# Patient Record
Sex: FEMALE | Race: WHITE | HISPANIC OR LATINO | Employment: FULL TIME | URBAN - METROPOLITAN AREA
[De-identification: names, ages, dates, MRNs, and addresses within clinical notes are randomized per-mention and may not be internally consistent; named-entity substitution may affect disease eponyms.]

---

## 2024-02-12 ENCOUNTER — INITIAL PRENATAL (OUTPATIENT)
Dept: OBSTETRICS AND GYNECOLOGY | Age: 34
End: 2024-02-12
Payer: MEDICAID

## 2024-02-12 VITALS
BODY MASS INDEX: 41.29 KG/M2 | HEIGHT: 63 IN | WEIGHT: 233 LBS | SYSTOLIC BLOOD PRESSURE: 132 MMHG | DIASTOLIC BLOOD PRESSURE: 80 MMHG

## 2024-02-12 DIAGNOSIS — Z12.4 SCREENING FOR MALIGNANT NEOPLASM OF THE CERVIX: ICD-10-CM

## 2024-02-12 DIAGNOSIS — Z87.59 HISTORY OF IUFD: ICD-10-CM

## 2024-02-12 DIAGNOSIS — Z13.89 SCREENING FOR BLOOD OR PROTEIN IN URINE: Primary | ICD-10-CM

## 2024-02-12 DIAGNOSIS — F43.10 PTSD (POST-TRAUMATIC STRESS DISORDER): ICD-10-CM

## 2024-02-12 DIAGNOSIS — F19.91 HISTORY OF DRUG USE: ICD-10-CM

## 2024-02-12 DIAGNOSIS — F41.9 ANXIETY AND DEPRESSION: ICD-10-CM

## 2024-02-12 DIAGNOSIS — F31.61 BIPOLAR DISORDER, CURRENT EPISODE MIXED, MILD: ICD-10-CM

## 2024-02-12 DIAGNOSIS — Z11.51 SPECIAL SCREENING EXAMINATION FOR HUMAN PAPILLOMAVIRUS (HPV): ICD-10-CM

## 2024-02-12 DIAGNOSIS — Z11.3 SCREENING EXAMINATION FOR VENEREAL DISEASE: ICD-10-CM

## 2024-02-12 DIAGNOSIS — Z3A.08 8 WEEKS GESTATION OF PREGNANCY: ICD-10-CM

## 2024-02-12 DIAGNOSIS — J45.41 MODERATE PERSISTENT ASTHMA WITH ACUTE EXACERBATION: ICD-10-CM

## 2024-02-12 DIAGNOSIS — Z98.891 PREVIOUS CESAREAN SECTION: ICD-10-CM

## 2024-02-12 DIAGNOSIS — F32.A ANXIETY AND DEPRESSION: ICD-10-CM

## 2024-02-12 PROBLEM — F31.9 BIPOLAR DISORDER: Status: ACTIVE | Noted: 2024-02-12

## 2024-02-12 PROBLEM — J45.909 ASTHMA: Status: ACTIVE | Noted: 2024-02-12

## 2024-02-12 LAB
GLUCOSE UR STRIP-MCNC: NEGATIVE MG/DL
PROT UR STRIP-MCNC: NEGATIVE MG/DL

## 2024-02-12 RX ORDER — ASPIRIN 81 MG/1
81 TABLET ORAL DAILY
Qty: 90 TABLET | Refills: 2 | Status: SHIPPED | OUTPATIENT
Start: 2024-02-12

## 2024-02-12 RX ORDER — FLUTICASONE PROPIONATE 110 UG/1
1 AEROSOL, METERED RESPIRATORY (INHALATION)
Qty: 12 G | Refills: 11 | Status: SHIPPED | OUTPATIENT
Start: 2024-02-12

## 2024-02-12 RX ORDER — CHOLECALCIFEROL (VITAMIN D3) 25 MCG
1 TABLET,CHEWABLE ORAL DAILY
Qty: 90 CAPSULE | Refills: 3 | Status: SHIPPED | OUTPATIENT
Start: 2024-02-12

## 2024-02-12 RX ORDER — ALBUTEROL SULFATE 90 UG/1
2 AEROSOL, METERED RESPIRATORY (INHALATION) EVERY 4 HOURS PRN
COMMUNITY

## 2024-02-14 LAB
BACTERIA UR CULT: NORMAL
BACTERIA UR CULT: NORMAL
C TRACH RRNA CVX QL NAA+PROBE: NEGATIVE
CYTOLOGIST CVX/VAG CYTO: NORMAL
CYTOLOGY CVX/VAG DOC CYTO: NORMAL
CYTOLOGY CVX/VAG DOC THIN PREP: NORMAL
DX ICD CODE: NORMAL
HIV 1 & 2 AB SER-IMP: NORMAL
HPV GENOTYPE REFLEX: NORMAL
HPV I/H RISK 4 DNA CVX QL PROBE+SIG AMP: NEGATIVE
N GONORRHOEA RRNA CVX QL NAA+PROBE: NEGATIVE
OTHER STN SPEC: NORMAL
STAT OF ADQ CVX/VAG CYTO-IMP: NORMAL

## 2024-02-17 LAB
AMPHETAMINES UR QL SCN: NEGATIVE NG/ML
BARBITURATES UR QL SCN: NEGATIVE NG/ML
BENZODIAZ UR QL: NEGATIVE NG/ML
BZE UR QL: NEGATIVE NG/ML
CARBOXYTHC UR QL CFM: POSITIVE
METHADONE UR QL SCN: NEGATIVE NG/ML
OPIATES UR QL: NEGATIVE NG/ML
PCP UR QL SCN: NEGATIVE NG/ML
PROPOXYPH UR QL SCN: NEGATIVE NG/ML

## 2024-02-19 PROBLEM — F12.90 MARIJUANA USE DURING PREGNANCY: Status: ACTIVE | Noted: 2024-02-19

## 2024-02-19 PROBLEM — O99.320 MARIJUANA USE DURING PREGNANCY: Status: ACTIVE | Noted: 2024-02-19

## 2024-02-23 LAB
ABO GROUP BLD: ABNORMAL
APTT HEX PL PPP: 3 SEC
APTT IMM NP PPP: NORMAL SEC
APTT PPP 1:1 SALINE: NORMAL SEC
APTT PPP: 24.5 SEC
AT III AG ACT/NOR PPP IA: 89 % (ref 72–124)
B2 GLYCOPROT1 IGA SER-ACNC: <10 SAU
B2 GLYCOPROT1 IGA SER-ACNC: <9 GPI IGA UNITS (ref 0–25)
B2 GLYCOPROT1 IGG SER-ACNC: <10 SGU
B2 GLYCOPROT1 IGG SER-ACNC: <9 GPI IGG UNITS (ref 0–20)
B2 GLYCOPROT1 IGM SER-ACNC: <10 SMU
B2 GLYCOPROT1 IGM SER-ACNC: <9 GPI IGM UNITS (ref 0–32)
BASOPHILS # BLD AUTO: 0.1 X10E3/UL (ref 0–0.2)
BASOPHILS NFR BLD AUTO: 1 %
BLD GP AB SCN SERPL QL: NEGATIVE
CARDIOLIPIN IGG SER IA-ACNC: <10 GPL
CARDIOLIPIN IGG SER IA-ACNC: <9 GPL U/ML (ref 0–14)
CARDIOLIPIN IGM SER IA-ACNC: <10 MPL
CARDIOLIPIN IGM SER IA-ACNC: <9 MPL U/ML (ref 0–12)
CONFIRM APTT: 0.8 SEC
CONFIRM DRVVT: NORMAL SEC
DRVVT SCREEN TO CONFIRM RATIO: NORMAL RATIO
EOSINOPHIL # BLD AUTO: 0.4 X10E3/UL (ref 0–0.4)
EOSINOPHIL NFR BLD AUTO: 4 %
ERYTHROCYTE [DISTWIDTH] IN BLOOD BY AUTOMATED COUNT: 12.5 % (ref 11.7–15.4)
F2 C.20210G>A GENO BLD/T: NORMAL
F5 P.R506Q BLD/T QL: NORMAL
FACT V ACT/NOR PPP: 124 % (ref 70–150)
HBV SURFACE AG SERPL QL IA: NEGATIVE
HCT VFR BLD AUTO: 37.4 % (ref 34–46.6)
HCV IGG SERPL QL IA: NON REACTIVE
HGB A MFR BLD ELPH: 97.7 % (ref 96.4–98.8)
HGB A2 MFR BLD ELPH: 2.3 % (ref 1.8–3.2)
HGB BLD-MCNC: 12.6 G/DL (ref 11.1–15.9)
HGB F MFR BLD ELPH: 0 % (ref 0–2)
HGB FRACT BLD-IMP: NORMAL
HGB S MFR BLD ELPH: 0 %
HIV 1+2 AB+HIV1 P24 AG SERPL QL IA: NON REACTIVE
IMM GRANULOCYTES # BLD AUTO: 0 X10E3/UL (ref 0–0.1)
IMM GRANULOCYTES NFR BLD AUTO: 0 %
IMP & REVIEW OF LAB RESULTS: NORMAL
IMP & REVIEW OF LAB RESULTS: NORMAL
INR PPP: 1 RATIO
LABORATORY COMMENT REPORT: NORMAL
LYMPHOCYTES # BLD AUTO: 2 X10E3/UL (ref 0.7–3.1)
LYMPHOCYTES NFR BLD AUTO: 19 %
MCH RBC QN AUTO: 29.9 PG (ref 26.6–33)
MCHC RBC AUTO-ENTMCNC: 33.7 G/DL (ref 31.5–35.7)
MCV RBC AUTO: 89 FL (ref 79–97)
MONOCYTES # BLD AUTO: 0.5 X10E3/UL (ref 0.1–0.9)
MONOCYTES NFR BLD AUTO: 5 %
NEUTROPHILS # BLD AUTO: 7.5 X10E3/UL (ref 1.4–7)
NEUTROPHILS NFR BLD AUTO: 71 %
PLATELET # BLD AUTO: 332 X10E3/UL (ref 150–450)
PROTHROMBIN TIME: 10.2 SEC
RBC # BLD AUTO: 4.22 X10E6/UL (ref 3.77–5.28)
RH BLD: POSITIVE
RPR SER QL: NON REACTIVE
RUBV IGG SERPL IA-ACNC: 1.25 INDEX
SCREEN DRVVT: 26.7 SEC
THROMBIN TIME: 15.6 SEC
TSH SERPL DL<=0.005 MIU/L-ACNC: 1.71 UIU/ML (ref 0.45–4.5)
WBC # BLD AUTO: 10.5 X10E3/UL (ref 3.4–10.8)

## 2024-02-28 ENCOUNTER — TELEPHONE (OUTPATIENT)
Dept: OBSTETRICS AND GYNECOLOGY | Age: 34
End: 2024-02-28
Payer: MEDICAID

## 2024-03-04 ENCOUNTER — ROUTINE PRENATAL (OUTPATIENT)
Dept: OBSTETRICS AND GYNECOLOGY | Age: 34
End: 2024-03-04
Payer: MEDICAID

## 2024-03-04 VITALS — DIASTOLIC BLOOD PRESSURE: 74 MMHG | BODY MASS INDEX: 42.34 KG/M2 | WEIGHT: 239 LBS | SYSTOLIC BLOOD PRESSURE: 128 MMHG

## 2024-03-04 DIAGNOSIS — Z3A.11 11 WEEKS GESTATION OF PREGNANCY: Primary | ICD-10-CM

## 2024-03-04 DIAGNOSIS — F41.9 ANXIETY AND DEPRESSION: ICD-10-CM

## 2024-03-04 DIAGNOSIS — F32.A ANXIETY AND DEPRESSION: ICD-10-CM

## 2024-03-04 DIAGNOSIS — Z30.46 NEXPLANON REMOVAL: ICD-10-CM

## 2024-03-04 DIAGNOSIS — Z87.59 HISTORY OF IUFD: ICD-10-CM

## 2024-03-04 DIAGNOSIS — Z98.891 PREVIOUS CESAREAN SECTION: ICD-10-CM

## 2024-03-04 LAB
GLUCOSE UR STRIP-MCNC: NEGATIVE MG/DL
PROT UR STRIP-MCNC: NEGATIVE MG/DL

## 2024-03-04 PROCEDURE — 99213 OFFICE O/P EST LOW 20 MIN: CPT | Performed by: PHYSICIAN ASSISTANT

## 2024-03-04 PROCEDURE — 11982 REMOVE DRUG IMPLANT DEVICE: CPT | Performed by: PHYSICIAN ASSISTANT

## 2024-03-04 NOTE — PROGRESS NOTES
"Chief Complaint   Patient presents with    Procedure     Ob pt, here to have nexplanon removed.       HPI: 33 y.o.  at 11w6d gestation  She is here for nexplanon removal  FHT were also documented and seen off handheld  She is doing well  Has her friend, Estrellita, with her today  Is worried about the removal of her nexplanon, was \"strapped down\" for the insertion  She can feel the nexplanon     Nexplanon Removal Procedure Note    Pre-operative Diagnosis: Nexplanon expiration    Post-operative Diagnosis: same    Indications: same    Procedure Details   The risks (including infection, bruising, irregular bleeding, pain at removal site, and injury to muscles, nerves and blood vessels) and benefits of the procedure were explained to the patient and/or guardian and written informed consent was obtained.      Nexplanon device was easily located by palpation of inner arm.  The device insertion site was painted with betadine and allowed to dry.  Device site anesthetized with 3 ml 2% lidocaine with epi.  End of device was located and 11 blade was used to make small incision.  Device was located in subcutaneous  tissue, grasped with hemostat and removed intact. Incision site was closed with steri-strips and band aid.   Pressure dressing applied.  There were no complications.      Pt tolerated procedure well      Condition:  Stable    Complications:  None    Plan:    The patient was advised to call for any rash, arm pain, fever, warmth or for prolonged bruising or bleeding. She was advised to use OTC acetaminophen as needed for mild to moderate pain.   Can remove pressure dressing in 24 hours. Band aid and steri strips in 2-3 days        .nexpla    Vitals:    24 1145   BP: 128/74   Weight: 108 kg (239 lb)       ROS:  GI:  Negative  : na  Pulmonary: Negative     A/P  1. Intrauterine pregnancy at 11w6d   2. Pregnancy Risk:  NORMAL    Diagnoses and all orders for this visit:    1. 11 weeks gestation of pregnancy " (Primary)  -     POC Urinalysis Dipstick, Multipro  -     Maximilian Panorama Prenatal Test Full Panel: Panorama Test Plus 5 Additional Microdeletions - Blood,  -     Maximilian Horizon 14 (Pan-Ethnic Standard) - Blood,    2. Anxiety and depression    3. Previous  section    4. History of IUFD    5. Nexplanon removal        -----------------------  PLAN:   Return in about 4 weeks (around 2024) for belly check/AFP?.      NACHO Botello  3/4/2024 14:00 EST

## 2024-03-11 LAB
Lab: NORMAL
NTRA 1P36 DELETION SYNDROME POPULATION-BASED RISK TEXT: NORMAL
NTRA 1P36 DELETION SYNDROME RESULT TEXT: NORMAL
NTRA 1P36 DELETION SYNDROME RISK SCORE TEXT: NORMAL
NTRA 22Q11.2 DELETION SYNDROME POPULATION-BASED RISK TEXT: NORMAL
NTRA 22Q11.2 DELETION SYNDROME RESULT TEXT: NORMAL
NTRA 22Q11.2 DELETION SYNDROME RISK SCORE TEXT: NORMAL
NTRA ANGELMAN SYNDROME POPULATION-BASED RISK TEXT: NORMAL
NTRA ANGELMAN SYNDROME RESULT TEXT: NORMAL
NTRA ANGELMAN SYNDROME RISK SCORE TEXT: NORMAL
NTRA CRI-DU-CHAT SYNDROME POPULATION-BASED RISK TEXT: NORMAL
NTRA CRI-DU-CHAT SYNDROME RESULT TEXT: NORMAL
NTRA CRI-DU-CHAT SYNDROME RISK SCORE TEXT: NORMAL
NTRA FETAL FRACTION: NORMAL
NTRA GENDER OF FETUS: NORMAL
NTRA MONOSOMY X AGE-BASED RISK TEXT: NORMAL
NTRA MONOSOMY X RESULT TEXT: NORMAL
NTRA MONOSOMY X RISK SCORE TEXT: NORMAL
NTRA PRADER-WILLI SYNDROME POPULATION-BASED RISK TEXT: NORMAL
NTRA PRADER-WILLI SYNDROME RESULT TEXT: NORMAL
NTRA PRADER-WILLI SYNDROME RISK SCORE TEXT: NORMAL
NTRA TRIPLOIDY RESULT TEXT: NORMAL
NTRA TRISOMY 13 AGE-BASED RISK TEXT: NORMAL
NTRA TRISOMY 13 RESULT TEXT: NORMAL
NTRA TRISOMY 13 RISK SCORE TEXT: NORMAL
NTRA TRISOMY 18 AGE-BASED RISK TEXT: NORMAL
NTRA TRISOMY 18 RESULT TEXT: NORMAL
NTRA TRISOMY 18 RISK SCORE TEXT: NORMAL
NTRA TRISOMY 21 AGE-BASED RISK TEXT: NORMAL
NTRA TRISOMY 21 RESULT TEXT: NORMAL
NTRA TRISOMY 21 RISK SCORE TEXT: NORMAL

## 2024-03-12 ENCOUNTER — OFFICE VISIT (OUTPATIENT)
Dept: FAMILY MEDICINE CLINIC | Facility: CLINIC | Age: 34
End: 2024-03-12
Payer: MEDICAID

## 2024-03-12 ENCOUNTER — ROUTINE PRENATAL (OUTPATIENT)
Dept: OBSTETRICS AND GYNECOLOGY | Age: 34
End: 2024-03-12
Payer: MEDICAID

## 2024-03-12 VITALS
SYSTOLIC BLOOD PRESSURE: 102 MMHG | HEART RATE: 92 BPM | DIASTOLIC BLOOD PRESSURE: 60 MMHG | TEMPERATURE: 97.1 F | OXYGEN SATURATION: 98 % | BODY MASS INDEX: 42.88 KG/M2 | HEIGHT: 63 IN | WEIGHT: 242 LBS

## 2024-03-12 VITALS — BODY MASS INDEX: 42.52 KG/M2 | WEIGHT: 240 LBS | SYSTOLIC BLOOD PRESSURE: 122 MMHG | DIASTOLIC BLOOD PRESSURE: 78 MMHG

## 2024-03-12 DIAGNOSIS — J45.20 MILD INTERMITTENT ASTHMA, UNSPECIFIED WHETHER COMPLICATED: ICD-10-CM

## 2024-03-12 DIAGNOSIS — O99.320 MARIJUANA USE DURING PREGNANCY: ICD-10-CM

## 2024-03-12 DIAGNOSIS — F12.90 MARIJUANA USE DURING PREGNANCY: ICD-10-CM

## 2024-03-12 DIAGNOSIS — Z00.00 ENCOUNTER FOR HEALTH MAINTENANCE EXAMINATION IN ADULT: Primary | ICD-10-CM

## 2024-03-12 DIAGNOSIS — M72.2 PLANTAR FASCIITIS: ICD-10-CM

## 2024-03-12 DIAGNOSIS — Z87.59 HISTORY OF IUFD: ICD-10-CM

## 2024-03-12 DIAGNOSIS — Z98.891 PREVIOUS CESAREAN SECTION: ICD-10-CM

## 2024-03-12 DIAGNOSIS — F41.9 ANXIETY AND DEPRESSION: ICD-10-CM

## 2024-03-12 DIAGNOSIS — F32.A ANXIETY AND DEPRESSION: ICD-10-CM

## 2024-03-12 DIAGNOSIS — Z13.89 SCREENING FOR BLOOD OR PROTEIN IN URINE: Primary | ICD-10-CM

## 2024-03-12 LAB
GLUCOSE UR STRIP-MCNC: NEGATIVE MG/DL
PROT UR STRIP-MCNC: NEGATIVE MG/DL

## 2024-03-12 RX ORDER — CETIRIZINE HYDROCHLORIDE 10 MG/1
1 TABLET ORAL DAILY
COMMUNITY
Start: 2024-02-21 | End: 2024-03-17

## 2024-03-12 NOTE — ASSESSMENT & PLAN NOTE
Colonoscopy: avg risk, start at age 48yo  Cervical Cancer Screening: UTD with GYN  Mammogram: avg risk, start at age 40  LDCT: former smoker - does not qualify  DEXA: indicated at age 65    Immunizations: eligible for PCV20, Tdap (will get in 3rd trimester)  Labs: reviewed today  The ASCVD Risk score (Poli CHO, et al., 2019) failed to calculate for the following reasons:    The 2019 ASCVD risk score is only valid for ages 40 to 79      Patient was counseled in regards to maintaining a healthy lifestyle, rich in whole grains, fruits and vegetables. Limit high saturated fats and processed sugars. Maintain an active lifestyle to promote overall health and well being.

## 2024-03-12 NOTE — PROGRESS NOTES
The patient is feeling much better on the steroid inhaler.  She also saw pulmonary and they recommended continuing.  No vaginal bleeding.  She had her Nexplanon removed.  Her cell free DNA and carrier testing was sent off and is pending.  She feels like her moods are good.    Hand-held ultrasound is used to visualize cardiac activity.  Doppler heart tones are positive at 145  Blood pressure 122/78 with no protein.    Assessment-13 weeks  Asthma-improved with daily steroid inhaler.  Continue rescue inhaler as needed and follow with pulmonary.  History of IUFD at 26 weeks.  Thrombophilia testing was negative.  Patient is taking low-dose aspirin.  Maternal-fetal medicine referral has been placed.  Patient did have a severe asthma exacerbation prior to the IUFD.  Bipolar, PTSD and depression-patient's moods are good today.  She is seeing therapy.  Nexplanon was removed  Prior  section-patient desires repeat  section  Genetic testing is pending.

## 2024-03-12 NOTE — ASSESSMENT & PLAN NOTE
With therapist.  Previously on med - not sure which. Did not like as it made depression worse.  Controlled. Continue current mgmt.

## 2024-03-12 NOTE — PROGRESS NOTES
"Chief Complaint  Establish Care (MURALI 9/2024/-no complaints/-requested physical )    Subjective        Lake Sprague presents to [facility]  History of Present Illness  33yoF with asthma, anxiety/depr/PTSD, bipolar disorder who presents to establish care and for annual exam.    Currently in first trimester of pregnancy. On prenatal and [id number].    Asthma - well controlled with Flovent maintenance inhaler and albuterol PRN.     Anxiety/Depr - also history of PTSD. Controlled.   Bipolar disorder - previously on medication but did not like the way she felt on this. Currently with therapist.     UTD on cancer screenings, dental/eye exams.       Objective   Vital Signs:  /60 (BP Location: Right arm, Patient Position: Sitting, Cuff Size: Adult)   Pulse 92   Temp 97.1 °F (36.2 °C) (Infrared)   Ht 160 cm (62.99\")   Wt 110 kg (242 lb)   SpO2 98%   BMI 42.88 kg/m²   Estimated body mass index is 42.88 kg/m² as calculated from the following:    Height as of this encounter: 160 cm (62.99\").    Weight as of this encounter: 110 kg (242 lb).         Physical Exam  Constitutional:       General: She is not in acute distress.  Eyes:      Conjunctiva/sclera: Conjunctivae normal.   Cardiovascular:      Rate and Rhythm: Normal rate and regular rhythm.   Pulmonary:      Effort: Pulmonary effort is normal. No respiratory distress.      Breath sounds: Normal breath sounds.   Abdominal:      Palpations: Abdomen is soft.      Tenderness: There is no abdominal tenderness.   Skin:     General: Skin is warm and dry.   Neurological:      Mental Status: She is alert and oriented to person, place, and time.   Psychiatric:         Mood and Affect: Mood normal.         Behavior: Behavior normal.        Result Review :    The following data was reviewed by: Nydia Vasquez MD on 03/12/2024:  Common labs          9/28/2023    03:37 10/1/2023    12:03 2/12/2024    10:13   Common Labs   WBC 9.62     8.37     10.5 "    Hemoglobin 13.6     13.4     12.6    Hematocrit 40.7     40.0     37.4    Platelets 329     323     332       Details          This result is from an external source.             Data reviewed : none             Assessment and Plan     Diagnoses and all orders for this visit:    1. Encounter for health maintenance examination in adult (Primary)  Assessment & Plan:  Colonoscopy: avg risk, start at age 44yo  Cervical Cancer Screening: UTD with GYN  Mammogram: avg risk, start at age 40  LDCT: former smoker - does not qualify  DEXA: indicated at age 65    Immunizations: eligible for PCV20, Tdap (will get in 3rd trimester)  Labs: reviewed today  The ASCVD Risk score (Poli CHO, et al., 2019) failed to calculate for the following reasons:    The 2019 ASCVD risk score is only valid for ages 40 to 79      Patient was counseled in regards to maintaining a healthy lifestyle, rich in whole grains, fruits and vegetables. Limit high saturated fats and processed sugars. Maintain an active lifestyle to promote overall health and well being.         2. Plantar fasciitis  Comments:  Worse with standing. Discussed good orthotics, stretches, tylenol. No anti-inflammatories in pregnancy.    3. Mild intermittent asthma, unspecified whether complicated  Assessment & Plan:  Controlled with flovent inhaler and albuterol PRN. Continue.            4. Anxiety and depression  Assessment & Plan:  With therapist.  Previously on med - not sure which. Did not like as it made depression worse.  Controlled. Continue current mgmt.                Follow Up     No follow-ups on file.  Patient was given instructions and counseling regarding her condition or for health maintenance advice. Please see specific information pulled into the AVS if appropriate.

## 2024-03-14 LAB
Lab: NEGATIVE
Lab: NORMAL
NTRA ALPHA-THALASSEMIA: NEGATIVE
NTRA BETA-HEMOGLOBINOPATHIES: NEGATIVE
NTRA CANAVAN DISEASE: NEGATIVE
NTRA CYSTIC FIBROSIS: NEGATIVE
NTRA DUCHENNE/BECKER MUSCULAR DYSTROPHY: NEGATIVE
NTRA FAMILIAL DYSAUTONOMIA: NEGATIVE
NTRA FRAGILE X SYNDROME: NEGATIVE
NTRA GALACTOSEMIA: NEGATIVE
NTRA GAUCHER DISEASE: NEGATIVE
NTRA MEDIUM CHAIN ACYL-COA DEHYDROGENASE DEFICIENCY: NEGATIVE
NTRA POLYCYSTIC KIDNEY DISEASE, AUTOSOMAL RECESSIVE: NEGATIVE
NTRA SMITH-LEMLI-OPITZ SYNDROME: NEGATIVE
NTRA SPINAL MUSCULAR ATROPHY: NEGATIVE
NTRA TAY-SACHS DISEASE: NEGATIVE

## 2024-03-18 ENCOUNTER — TELEMEDICINE (OUTPATIENT)
Dept: PSYCHIATRY | Facility: CLINIC | Age: 34
End: 2024-03-18
Payer: MEDICAID

## 2024-03-18 DIAGNOSIS — F33.1 MAJOR DEPRESSIVE DISORDER, RECURRENT EPISODE, MODERATE: ICD-10-CM

## 2024-03-18 DIAGNOSIS — F43.10 POST TRAUMATIC STRESS DISORDER (PTSD): ICD-10-CM

## 2024-03-18 DIAGNOSIS — F41.1 GENERALIZED ANXIETY DISORDER: ICD-10-CM

## 2024-03-18 DIAGNOSIS — F31.81 BIPOLAR II DISORDER: ICD-10-CM
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
## 2024-03-20 ENCOUNTER — TELEPHONE (OUTPATIENT)
Dept: PSYCHIATRY | Facility: CLINIC | Age: 34
End: 2024-03-20
Payer: MEDICAID

## 2024-03-20 NOTE — TELEPHONE ENCOUNTER
Sent Patient a [email or web address] message welcoming patient to our practice and asking for a feedback.

## 2024-03-22 ENCOUNTER — TRANSCRIBE ORDERS (OUTPATIENT)
Dept: ULTRASOUND IMAGING | Facility: HOSPITAL | Age: 34
End: 2024-03-22
Payer: MEDICAID

## 2024-03-22 DIAGNOSIS — Z87.59 HISTORY OF IUFD: Primary | ICD-10-CM

## 2024-03-26 ENCOUNTER — OFFICE VISIT (OUTPATIENT)
Dept: OBSTETRICS AND GYNECOLOGY | Facility: CLINIC | Age: 34
End: 2024-03-26
Payer: MEDICAID

## 2024-03-26 ENCOUNTER — HOSPITAL ENCOUNTER (OUTPATIENT)
Dept: ULTRASOUND IMAGING | Facility: HOSPITAL | Age: 34
Discharge: HOME | End: 2024-03-26
Admitting: OBSTETRICS & GYNECOLOGY
Payer: MEDICAID

## 2024-03-26 VITALS
DIASTOLIC BLOOD PRESSURE: 70 MMHG | HEIGHT: 63 IN | SYSTOLIC BLOOD PRESSURE: 113 MMHG | WEIGHT: 253 LBS | TEMPERATURE: 98 F | HEART RATE: 88 BPM | BODY MASS INDEX: 44.83 KG/M2

## 2024-03-26 DIAGNOSIS — Z87.59 HISTORY OF IUFD: ICD-10-CM

## 2024-03-26 DIAGNOSIS — O99.519 ASTHMA AFFECTING PREGNANCY, ANTEPARTUM: Primary | ICD-10-CM

## 2024-03-26 DIAGNOSIS — J45.909 ASTHMA AFFECTING PREGNANCY, ANTEPARTUM: Primary | ICD-10-CM

## 2024-03-26 PROCEDURE — 76805 OB US >/= 14 WKS SNGL FETUS: CPT

## 2024-03-26 NOTE — PROGRESS NOTES
Pt reports that she is doing well and denies vaginal bleeding, cramping, contractions or LOF at this time. Notes occasional lower abdominal cramping/stretching discomfort, denies consistency. Reports starting to feel fetal movements/flutters at this point in pregnancy. Reviewed when to call OB office or present to L&D for evaluation with symptoms such as vaginal bleeding, LOF or ctxs. Pt verbalized understanding. Denies HA, visual changes or epigastric pain at time of appointment. Denies any additional complaints at time of appointment. Next OB appointment scheduled for 4/10.    Vitals:    03/26/24 1420   BP: 113/70   Pulse: 88   Temp: 98 °F (36.7 °C)
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
## 2024-03-27 PROBLEM — F31.81 BIPOLAR 2 DISORDER: Status: ACTIVE | Noted: 2024-02-12

## 2024-03-29 ENCOUNTER — TELEMEDICINE (OUTPATIENT)
Dept: PSYCHIATRY | Facility: CLINIC | Age: 34
End: 2024-03-29
Payer: MEDICAID

## 2024-03-29 DIAGNOSIS — F43.10 POST TRAUMATIC STRESS DISORDER (PTSD): ICD-10-CM

## 2024-03-29 DIAGNOSIS — F41.1 GENERALIZED ANXIETY DISORDER: ICD-10-CM

## 2024-03-29 DIAGNOSIS — F31.81 BIPOLAR II DISORDER: ICD-10-CM

## 2024-03-29 DIAGNOSIS — F33.1 MAJOR DEPRESSIVE DISORDER, RECURRENT EPISODE, MODERATE: ICD-10-CM

## 2024-03-29 NOTE — PROGRESS NOTES
Date: March 29, 2024  Time In: 09:30 EDT  Time Out: 10:03 EDT    This provider is located at the [facility] [address], [place] [address] using a secure [email or web address] Video Visit through [employer]. Patient is being seen remotely via telehealth at home address in [place] and stated they are in a secure environment for this session. The patient's condition being diagnosed/treated is appropriate for telemedicine. The provider identified herself as well as her credentials. The patient, and/or patients guardian, consent to be seen remotely, and when consent is given they understand that the consent allows for patient identifiable information to be sent to a third party as needed. They may refuse to be seen remotely at any time. The electronic data is encrypted and password protected, and the patient and/or guardian has been advised of the potential risks to privacy not withstanding such measures.     You have chosen to receive care through a telehealth visit.  Do you consent to use a video/audio connection for your medical care today? Yes    Subjective   Lake Sprague is a 33 y.o. female who presents today for follow up    Chief Complaint:   Chief Complaint   Patient presents with    Anxiety    Depression    PTSD    Manic Behavior           Clinical Maneuvering/Intervention:      Assisted patient in processing above session content; acknowledged and normalized patient’s thoughts, feelings, and concerns.  Rationalized patient thought process regarding concerns presented at session.  Discussed triggers associated with patient's  anxiety , depression , manic symptoms , and PTSD Also discussed coping skills for patient to implement such as increasing activity , self care , and positive self talk       Allowed patient to freely discuss issues without interruption or judgment. Provided safe, confidential environment to facilitate the development of positive  therapeutic relationship and encourage open, honest communication. Assisted patient in identifying risk factors which would indicate the need for higher level of care including thoughts to harm self or others and/or self-harming behavior and encouraged patient to contact this office, call 911, or present to the nearest emergency room should any of these events occur. Discussed crisis intervention services and means to access. Patient adamantly and convincingly denies current suicidal or homicidal ideation or perceptual disturbance.    Assessment:     Patient appears to maintain relative stability as compared to their baseline.  However, patient continues to struggle with anxiety, depression and PTSD, states that 2018/6 years ago BiPolar II was diagnosed, which continues to cause impairment in important areas of functioning.  A result, they can be reasonably expected to continue to benefit from treatment and would likely be at increased risk for decompensation otherwise.      PHQ-Score Total:  PHQ-9 Total Score: 19    LEONARDO-7 Score Total:  Over the last two weeks, how often have you been bothered by the following problems?  Feeling nervous, anxious or on edge: Nearly every day  Not being able to stop or control worrying: Nearly every day  Worrying too much about different things: Nearly every day  Trouble Relaxing: Nearly every day  Being so restless that it is hard to sit still: More than half the days  Becoming easily annoyed or irritable: Nearly every day  Feeling afraid as if something awful might happen: Nearly every day  LEONARDO 7 Total Score: 20  If you checked any problems, how difficult have these problems made it for you to do your work, take care of things at home, or get along with other people: Extremely difficult      Mental Status Exam:   Hygiene:   good  Cooperation:  Cooperative  Eye Contact:  Good  Psychomotor Behavior:  Aggitated  Affect:  Angry  Mood: anxious and irritable  Speech:  Rapid  Thought  Process:  Pressured by negative feelings towards father of her expected baby  Thought Content:   questioning her pregnancy  Suicidal:  None  Homicidal:  None  Hallucinations:  None  Delusion:  None  Memory:  Intact  Orientation:  Person, Place, Time, and Situation  Reliability:  good  Insight:  Fair  Judgement:  Fair  Impulse Control:  Fair  Physical/Medical Issues:  Yes patient expressed concerns about her pregnancy relating to father of baby        Patient's Support Network Includes:   Godmother and friends    Functional Status: Moderate impairment     Progress toward goal: Not at goal    Prognosis: Guarded with Ongoing Treatment      Plan:    Patient will continue in individual outpatient therapy with focus on improved functioning and coping skills, maintaining stability, and avoiding decompensation and the need for higher level of care.    Patient will adhere to medication regimen as prescribed and report any side effects. Patient will contact this office, call 911 or present to the nearest emergency room should suicidal or homicidal ideations occur. Provide Cognitive Behavioral Therapy and Solution Focused Therapy to improve functioning, maintain stability, and avoid decompensation and the need for higher level of care.         VISIT DIAGNOSIS:     ICD-10-CM ICD-9-CM   1. Generalized anxiety disorder  F41.1 300.02   2. Major depressive disorder, recurrent episode, moderate  F33.1 296.32   3. Bipolar II disorder  F31.81 296.89   4. Post traumatic stress disorder (PTSD)  F43.10 309.81        Return in about 1 week (around 4/5/2024).    This document has been electronically signed by Inna Callahan [facility]  April 5, 2024 12:13 EDT     Part of this note may be an electronic transcription/translation of spoken language to printed text using the Dragon Dictation System.

## 2024-04-03 ENCOUNTER — PATIENT ROUNDING (BHMG ONLY) (OUTPATIENT)
Dept: OBSTETRICS AND GYNECOLOGY | Facility: CLINIC | Age: 34
End: 2024-04-03
Payer: MEDICAID

## 2024-04-03 ENCOUNTER — ROUTINE PRENATAL (OUTPATIENT)
Dept: OBSTETRICS AND GYNECOLOGY | Age: 34
End: 2024-04-03
Payer: MEDICAID

## 2024-04-03 VITALS — SYSTOLIC BLOOD PRESSURE: 122 MMHG | BODY MASS INDEX: 43.58 KG/M2 | WEIGHT: 246 LBS | DIASTOLIC BLOOD PRESSURE: 70 MMHG

## 2024-04-03 DIAGNOSIS — Z98.891 PREVIOUS CESAREAN SECTION: ICD-10-CM

## 2024-04-03 DIAGNOSIS — F12.90 MARIJUANA USE DURING PREGNANCY: ICD-10-CM

## 2024-04-03 DIAGNOSIS — F32.A ANXIETY AND DEPRESSION: ICD-10-CM

## 2024-04-03 DIAGNOSIS — O99.320 MARIJUANA USE DURING PREGNANCY: ICD-10-CM

## 2024-04-03 DIAGNOSIS — F41.9 ANXIETY AND DEPRESSION: ICD-10-CM

## 2024-04-03 DIAGNOSIS — Z3A.16 16 WEEKS GESTATION OF PREGNANCY: Primary | ICD-10-CM

## 2024-04-03 DIAGNOSIS — Z87.59 HISTORY OF IUFD: ICD-10-CM

## 2024-04-03 LAB
GLUCOSE UR STRIP-MCNC: NEGATIVE MG/DL
PROT UR STRIP-MCNC: NEGATIVE MG/DL

## 2024-04-03 NOTE — PROGRESS NOTES
Chief Complaint   Patient presents with    Routine Prenatal Visit     16 week ob visit       HPI: 33 y.o.  at 16w1d gestation  She is here for a problem visit  Is dealing with mood issues  Does see a therapist but not connecting with her and may seek out a different one  She works for [employer] so I have suggested she research [employer] as well  She does feel as if she would like to start some medication  Has been on something in the past that did not hlep and she did not respond well, to  She has not used zoloft before and would be open to trying this  She is not currently SI or HI  Is awre to d/c medication if she is not tolerating it well  Can also try benadryl prn acute anxiety  Also knows to seek care at an ER for acutely worsening sxs and/or SI or HI  She is accompanied by her best friend and has good support from her partner at home  Has an appt next week with Dr Mckeon and plans to do the AFP test at that time    Vitals:    24 1126   BP: 122/70   Weight: 112 kg (246 lb)       ROS:  GI:  Negative  : na  Pulmonary: Negative     A/P  1. Intrauterine pregnancy at 16w1d   2. Pregnancy Risk:  COMPLICATED    Diagnoses and all orders for this visit:    1. 16 weeks gestation of pregnancy (Primary)  -     POC Urinalysis Dipstick, Multipro    2. History of IUFD    3. Previous  section    4. Marijuana use during pregnancy    5. Body mass index (BMI) 40.0-44.9, adult    6. Anxiety and depression  -     sertraline (Zoloft) 50 MG tablet; Take 1 tablet by mouth Daily.  Dispense: 30 tablet; Refill: 2        -----------------------  PLAN:   Return in about 4 weeks (around 2024) for anatomy scan.      NACHO Botello  4/3/2024 12:45 EDT

## 2024-04-08 RX ORDER — ALBUTEROL SULFATE 90 UG/1
2 AEROSOL, METERED RESPIRATORY (INHALATION) EVERY 4 HOURS PRN
Qty: 18 G | Refills: 2 | Status: SHIPPED | OUTPATIENT
Start: 2024-04-08

## 2024-04-08 NOTE — TELEPHONE ENCOUNTER
Rx Refill Note  Requested Prescriptions     Pending Prescriptions Disp Refills    albuterol sulfate  (90 Base) MCG/ACT inhaler       Sig: Inhale 2 puffs Every 4 (Four) Hours As Needed for Wheezing.      Last office visit with prescribing clinician: 3/12/2024   Last telemedicine visit with prescribing clinician: Visit date not found   Next office visit with prescribing clinician: Visit date not found                         Would you like a call back once the refill request has been completed: [] Yes [] No    If the office needs to give you a call back, can they leave a voicemail: [] Yes [] No    Nnamdi Terry CMA/LMR  04/08/24, 07:49 EDT

## 2024-04-10 ENCOUNTER — TELEPHONE (OUTPATIENT)
Dept: PSYCHIATRY | Facility: CLINIC | Age: 34
End: 2024-04-10
Payer: MEDICAID

## 2024-04-10 ENCOUNTER — ROUTINE PRENATAL (OUTPATIENT)
Dept: OBSTETRICS AND GYNECOLOGY | Age: 34
End: 2024-04-10
Payer: MEDICAID

## 2024-04-10 ENCOUNTER — TELEPHONE (OUTPATIENT)
Dept: OBSTETRICS AND GYNECOLOGY | Age: 34
End: 2024-04-10

## 2024-04-10 VITALS — WEIGHT: 244 LBS | SYSTOLIC BLOOD PRESSURE: 110 MMHG | BODY MASS INDEX: 43.22 KG/M2 | DIASTOLIC BLOOD PRESSURE: 70 MMHG

## 2024-04-10 DIAGNOSIS — Z13.89 SCREENING FOR BLOOD OR PROTEIN IN URINE: Primary | ICD-10-CM

## 2024-04-10 DIAGNOSIS — Z87.59 HISTORY OF IUFD: ICD-10-CM

## 2024-04-10 DIAGNOSIS — Z98.891 PREVIOUS CESAREAN SECTION: ICD-10-CM

## 2024-04-10 LAB
GLUCOSE UR STRIP-MCNC: NEGATIVE MG/DL
PROT UR STRIP-MCNC: NEGATIVE MG/DL

## 2024-04-10 RX ORDER — ACETAMINOPHEN 500 MG
1000 TABLET ORAL ONCE
OUTPATIENT
Start: 2024-04-10 | End: 2024-04-10

## 2024-04-10 RX ORDER — CARBOPROST TROMETHAMINE 250 UG/ML
250 INJECTION, SOLUTION INTRAMUSCULAR AS NEEDED
OUTPATIENT
Start: 2024-04-10

## 2024-04-10 RX ORDER — SODIUM CHLORIDE, SODIUM LACTATE, POTASSIUM CHLORIDE, CALCIUM CHLORIDE 600; 310; 30; 20 MG/100ML; MG/100ML; MG/100ML; MG/100ML
125 INJECTION, SOLUTION INTRAVENOUS CONTINUOUS
OUTPATIENT
Start: 2024-04-10

## 2024-04-10 RX ORDER — OXYTOCIN/0.9 % SODIUM CHLORIDE 30/500 ML
250 PLASTIC BAG, INJECTION (ML) INTRAVENOUS CONTINUOUS
OUTPATIENT
Start: 2024-04-10 | End: 2024-04-10

## 2024-04-10 RX ORDER — ONDANSETRON 4 MG/1
4 TABLET, ORALLY DISINTEGRATING ORAL EVERY 6 HOURS PRN
OUTPATIENT
Start: 2024-04-10

## 2024-04-10 RX ORDER — LIDOCAINE HYDROCHLORIDE 10 MG/ML
0.5 INJECTION, SOLUTION INFILTRATION; PERINEURAL ONCE AS NEEDED
OUTPATIENT
Start: 2024-04-10

## 2024-04-10 RX ORDER — MISOPROSTOL 100 UG/1
800 TABLET ORAL AS NEEDED
OUTPATIENT
Start: 2024-04-10

## 2024-04-10 RX ORDER — SODIUM CHLORIDE 0.9 % (FLUSH) 0.9 %
10 SYRINGE (ML) INJECTION AS NEEDED
OUTPATIENT
Start: 2024-04-10

## 2024-04-10 RX ORDER — SODIUM CHLORIDE 9 MG/ML
40 INJECTION, SOLUTION INTRAVENOUS AS NEEDED
OUTPATIENT
Start: 2024-04-10

## 2024-04-10 RX ORDER — KETOROLAC TROMETHAMINE 15 MG/ML
30 INJECTION, SOLUTION INTRAMUSCULAR; INTRAVENOUS ONCE
OUTPATIENT
Start: 2024-04-10 | End: 2024-04-10

## 2024-04-10 RX ORDER — ONDANSETRON 2 MG/ML
4 INJECTION INTRAMUSCULAR; INTRAVENOUS EVERY 6 HOURS PRN
OUTPATIENT
Start: 2024-04-10

## 2024-04-10 RX ORDER — METHYLERGONOVINE MALEATE 0.2 MG/ML
200 INJECTION INTRAVENOUS AS NEEDED
OUTPATIENT
Start: 2024-04-10

## 2024-04-10 RX ORDER — OXYTOCIN/0.9 % SODIUM CHLORIDE 30/500 ML
999 PLASTIC BAG, INJECTION (ML) INTRAVENOUS ONCE
OUTPATIENT
Start: 2024-04-10

## 2024-04-10 RX ORDER — SODIUM CHLORIDE 0.9 % (FLUSH) 0.9 %
10 SYRINGE (ML) INJECTION EVERY 12 HOURS SCHEDULED
OUTPATIENT
Start: 2024-04-10

## 2024-04-10 NOTE — TELEPHONE ENCOUNTER
Pt states she doesn't feel that she meshes well with therapist and would like to do a transfer of care.

## 2024-04-10 NOTE — PROGRESS NOTES
Patient has not started her Zoloft yet but plans to start it soon.  She did see maternal-fetal medicine.  She is going back for anatomy ultrasound in about 3 weeks.  Dr. Parks did mention that the patient could consider delivery between 37 and 39 weeks.  She recommended  testing starting at 32 weeks 1-2 times weekly.  Asthma is under good control.  Patient does desire sterilization at time of .    Doppler tones are positive  Blood pressure 110/70 with no protein   Weight gain for pregnancy is high.    Assessment-17 weeks  History of intrauterine fetal demise at 26 weeks.  Thrombophilia panel was negative.  Patient is taking her low-dose aspirin.  The demise did seem linked to an asthma attack but we discussed we cannot totally be sure.  Discussed that Dr. Parks had mentioned delivery between 37 to 39 weeks.  Patient desires delivery on the early end of that range.  We will also plan  testing starting at 32 weeks.  Patient desires permanent sterilization.  She will signed tubal consent today.  Bipolar, PTSD and depression-patient notes her moods are good.  She is seeing a therapist.  She plans to start her Zoloft  Prior  section-patient desires repeat  section and bilateral salpingectomy  Asthma-greatly improved with daily inhaler.  Continue to follow with pulmonary  Follow-up with me after anatomy ultrasound with LIAN.

## 2024-04-10 NOTE — TELEPHONE ENCOUNTER
Spoke with BROOKE Le to see if she was ok with taking pt as a transfer of care initial and therapist is agreeable to take pt.    Called pt and left a vm to call the office back to be scheduled and an Initial with BROOKE Le.

## 2024-04-10 NOTE — TELEPHONE ENCOUNTER
Pt calling stating she has CS scheduled on 8/27/24 & has family coming in that week. Pt would like to push CS back a few days if possible. Please advise.

## 2024-04-12 LAB
AFP INTERP SERPL-IMP: NORMAL
AFP INTERP SERPL-IMP: NORMAL
AFP MOM SERPL: 1.61
AFP SERPL-MCNC: 47.3 NG/ML
AGE AT DELIVERY: 32 YR
GA METHOD: NORMAL
GA: 17.1 WEEKS
IDDM PATIENT QL: NO
LABORATORY COMMENT REPORT: NORMAL
MULTIPLE PREGNANCY: NO
NEURAL TUBE DEFECT RISK FETUS: 2047 %
RESULT: NORMAL

## 2024-04-18 ENCOUNTER — TRANSCRIBE ORDERS (OUTPATIENT)
Dept: ULTRASOUND IMAGING | Facility: HOSPITAL | Age: 34
End: 2024-04-18
Payer: MEDICAID

## 2024-04-18 DIAGNOSIS — Z87.59 HISTORY OF IUFD: Primary | ICD-10-CM

## 2024-04-24 ENCOUNTER — OFFICE VISIT (OUTPATIENT)
Dept: OBSTETRICS AND GYNECOLOGY | Facility: CLINIC | Age: 34
End: 2024-04-24
Payer: MEDICAID

## 2024-04-24 ENCOUNTER — HOSPITAL ENCOUNTER (OUTPATIENT)
Dept: ULTRASOUND IMAGING | Facility: HOSPITAL | Age: 34
Discharge: HOME | End: 2024-04-24
Admitting: OBSTETRICS & GYNECOLOGY
Payer: MEDICAID

## 2024-04-24 VITALS
TEMPERATURE: 98 F | HEART RATE: 77 BPM | HEIGHT: 63 IN | BODY MASS INDEX: 43.94 KG/M2 | DIASTOLIC BLOOD PRESSURE: 62 MMHG | WEIGHT: 248 LBS | SYSTOLIC BLOOD PRESSURE: 120 MMHG

## 2024-04-24 DIAGNOSIS — J45.909 ASTHMA AFFECTING PREGNANCY, ANTEPARTUM: ICD-10-CM

## 2024-04-24 DIAGNOSIS — O46.8X2 SUBCHORIONIC HEMORRHAGE IN SECOND TRIMESTER: ICD-10-CM

## 2024-04-24 DIAGNOSIS — Z87.59 HISTORY OF IUFD: Primary | ICD-10-CM

## 2024-04-24 DIAGNOSIS — O99.519 ASTHMA AFFECTING PREGNANCY, ANTEPARTUM: ICD-10-CM

## 2024-04-24 DIAGNOSIS — Z87.59 HISTORY OF IUFD: ICD-10-CM

## 2024-04-24 PROCEDURE — 76817 TRANSVAGINAL US OBSTETRIC: CPT

## 2024-04-24 PROCEDURE — 76811 OB US DETAILED SNGL FETUS: CPT
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
## 2024-04-24 NOTE — PROGRESS NOTES
Pt reports that she is doing well and denies vaginal bleeding, contractions or LOF at this time. Patient states that she is having mild cramping but that they are not constant. Reports active fetal movement. Reviewed when to call OB office or present to L&D for evaluation with symptoms such as decreased fetal movement, vaginal bleeding, LOF or ctxs. Pt verbalized understanding. Denies HA, visual changes or epigastric pain. Denies any additional complaints at time of appointment. Next OB appointment scheduled for 04/25/2024    Vitals:    04/24/24 0900   BP: 120/62   Pulse: 77   Temp: 98 °F (36.7 °C)

## 2024-04-25 ENCOUNTER — ROUTINE PRENATAL (OUTPATIENT)
Dept: OBSTETRICS AND GYNECOLOGY | Age: 34
End: 2024-04-25
Payer: MEDICAID

## 2024-04-25 VITALS — DIASTOLIC BLOOD PRESSURE: 68 MMHG | BODY MASS INDEX: 44.11 KG/M2 | WEIGHT: 249 LBS | SYSTOLIC BLOOD PRESSURE: 120 MMHG

## 2024-04-25 DIAGNOSIS — F12.90 MARIJUANA USE DURING PREGNANCY: ICD-10-CM

## 2024-04-25 DIAGNOSIS — F41.9 ANXIETY AND DEPRESSION: ICD-10-CM

## 2024-04-25 DIAGNOSIS — Z87.59 HISTORY OF IUFD: ICD-10-CM

## 2024-04-25 DIAGNOSIS — Z98.891 PREVIOUS CESAREAN SECTION: ICD-10-CM

## 2024-04-25 DIAGNOSIS — F32.A ANXIETY AND DEPRESSION: ICD-10-CM

## 2024-04-25 DIAGNOSIS — O99.320 MARIJUANA USE DURING PREGNANCY: ICD-10-CM

## 2024-04-25 DIAGNOSIS — J45.20 MILD INTERMITTENT ASTHMA, UNSPECIFIED WHETHER COMPLICATED: ICD-10-CM

## 2024-04-25 DIAGNOSIS — Z13.89 SCREENING FOR BLOOD OR PROTEIN IN URINE: Primary | ICD-10-CM

## 2024-04-25 PROBLEM — O46.8X9 SUBCHORIONIC HEMORRHAGE OF PLACENTA, ANTEPARTUM: Status: ACTIVE | Noted: 2024-04-25

## 2024-04-25 LAB
GLUCOSE UR STRIP-MCNC: NEGATIVE MG/DL
PROT UR STRIP-MCNC: ABNORMAL MG/DL

## 2024-04-25 NOTE — PROGRESS NOTES
CC- pregnancy    HPI: 33 y.o.  at 19w2d patient reports she is feeling fetal movements.  She has been feeling stressed.  She saw MFM yesterday.  A subchorionic hemorrhage was seen and she has been worried about that.  She would like to be able to sit down more at work on a stool.  She is having no vaginal bleeding or loss of fluid.  Patient reports that she used marijuana 1 more time when she was stressed but is since stopped.    EXAM:  Last OB US growth (since 2023)       None          Vitals:    24 1137   BP: 120/68   Weight: 113 kg (249 lb)     Total weight gain for pregnancy:  9.526 kg (21 lb)  Exam-obese abdomen with positive Doppler heart tones  Ultrasound with MFM was reviewed.  Anatomy is incomplete.  Subchorionic hemorrhage was noted.  Possible accessory lobe and possible marginal or velamentous insertion of the umbilical cord.  A/P  1. Intrauterine pregnancy at 19w2d   2. Pregnancy Risk:  HIGH RISK    Diagnoses and all orders for this visit:    1. Screening for blood or protein in urine (Primary)  -     POC Urinalysis Dipstick    2. Previous  section    3. History of IUFD  Overview:  26 weeks       4. Anxiety and depression    5. Mild intermittent asthma, unspecified whether complicated    6. Marijuana use during pregnancy    7. Body mass index (BMI) 40.0-44.9, adult    Patient will complete anatomy ultrasound on  with MFM and follow-up with me a day later.  Continue inhalers for asthma  Continue low-dose aspirin  Due to history of IUFD.  Patient will start kick counts at 28 weeks and start biweekly testing at 32 weeks.  Her  is scheduled at 37 weeks on .  Discussed risk of marijuana and encouraged the patient to continue abstinence  Breast pump was ordered today  Okay to sit at work.      -----------------------  PLAN:   Return in about 4 weeks (around 2024) for Recheck.      Dennis Mckeon MD  2024 12:10 EDT

## 2024-05-01 ENCOUNTER — TRANSCRIBE ORDERS (OUTPATIENT)
Dept: ULTRASOUND IMAGING | Facility: HOSPITAL | Age: 34
End: 2024-05-01
Payer: MEDICAID

## 2024-05-01 DIAGNOSIS — Z87.59 HISTORY OF IUFD: Primary | ICD-10-CM

## 2024-05-02 ENCOUNTER — TELEMEDICINE (OUTPATIENT)
Dept: PSYCHIATRY | Facility: CLINIC | Age: 34
End: 2024-05-02
Payer: MEDICAID

## 2024-05-02 DIAGNOSIS — F31.81 BIPOLAR II DISORDER: ICD-10-CM

## 2024-05-02 DIAGNOSIS — F43.10 POST TRAUMATIC STRESS DISORDER (PTSD): ICD-10-CM

## 2024-05-02 DIAGNOSIS — F41.1 GENERALIZED ANXIETY DISORDER: Primary | ICD-10-CM
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
## 2024-05-06 DIAGNOSIS — F41.9 ANXIETY AND DEPRESSION: ICD-10-CM

## 2024-05-06 DIAGNOSIS — F32.A ANXIETY AND DEPRESSION: ICD-10-CM

## 2024-05-09 ENCOUNTER — REFERRAL TRIAGE (OUTPATIENT)
Dept: LABOR AND DELIVERY | Facility: HOSPITAL | Age: 34
End: 2024-05-09
Payer: MEDICAID

## 2024-05-13 ENCOUNTER — PATIENT OUTREACH (OUTPATIENT)
Dept: LABOR AND DELIVERY | Facility: HOSPITAL | Age: 34
End: 2024-05-13
Payer: MEDICAID

## 2024-05-13 NOTE — OUTREACH NOTE
[facility]  Enrollment    Current Estimated Gestational Age: 21w6d    Questions/Answers      Flowsheet Row Responses   Would like to participate? Yes   Date of Intake Visit 05/13/24            Олег Ndiaye RN  Maternity Nurse Navigator    5/13/2024, 13:21 EDT    [facility]  Intake    Current Estimated Gestational Age: 21w6d    Intake Assessment      Flowsheet Row Responses   Best Method for Contacting Cell   Currently Employed Yes   Able to keep appointments as scheduled Yes   Resources Presently Utilizing: None   Maternal Warning Signs Provided   Other Education HANDS, How to find a dentist, Insurance benefits/Incentives, Mental Health Services, SNAP Benefits, Transportation Assistance, WIC Benefits            Learning Assessment      Flowsheet Row Responses   Relationship Patient   Does the learner have any barriers to learning? No Barriers   What is the preferred language of the learner for medical teaching? English   Is an  required? No   How does the learner prefer to learn new concepts? Listening, Reading            Tobacco, Alcohol, and Drug History     reports that she has quit smoking. Her smoking use included cigarettes. She started smoking about 5 years ago. She has a 1.3 pack-year smoking history. She has been exposed to tobacco smoke. She has never used smokeless tobacco.   reports that she does not currently use alcohol.   reports that she does not currently use drugs after having used the following drugs: Cocaine(coke), Marijuana, and Hydrocodone.    Олег Ndiaye RN  Maternity Nurse Navigator    5/13/2024, 13:21 EDT    [facility]  Check-In    Current Estimated Gestational Age: 21w6d      Questions/Answers      Flowsheet Row Responses   Best Method for Contacting Cell   Demographics Reviewed Yes   Currently Employed Yes   Able to keep appointments as scheduled Yes   Baby Active/Feeling Fetal Movemen Yes   How are you presently feeling? good   Education related  to new diagnoses/home equipment No   May I ask you questions about your substance use? --  [denies hx]   Resource/Environmental Concerns Reliable Transportation, Other   Other Concerns food   Do you have any questions related to your care experience, your pregnancy, plans for delivery, any concerns, etc? No   Other Education HANDS, How to find a dentist, Insurance benefits/Incentives, Mental Health Services, SNAP Benefits, Transportation Assistance, WIC Benefits            Pt doing well today, no c/o.  Getting rides from others and [employer], enc to call about insurance transportation and [employer].  Pt to call about insurance benefits, WIC, and SNAP.  Sees [facility] for counseling and also on Zoloft, reports depression feeling better since treatment.  Sent intake packet +fed trans and food resources.  Maternal warning s/s reviewed, pt verbalized understanding.  Encouraged to reach out with any needs.  Will follow up again around 28wks.    Олег Ndiaye RN  Maternity Nurse Navigator    5/13/2024, 13:21 EDT

## 2024-05-22 ENCOUNTER — HOSPITAL ENCOUNTER (OUTPATIENT)
Dept: ULTRASOUND IMAGING | Facility: HOSPITAL | Age: 34
Discharge: HOME | End: 2024-05-22
Admitting: OBSTETRICS & GYNECOLOGY
Payer: MEDICAID

## 2024-05-22 ENCOUNTER — OFFICE VISIT (OUTPATIENT)
Dept: OBSTETRICS AND GYNECOLOGY | Facility: CLINIC | Age: 34
End: 2024-05-22
Payer: MEDICAID

## 2024-05-22 VITALS
SYSTOLIC BLOOD PRESSURE: 119 MMHG | DIASTOLIC BLOOD PRESSURE: 61 MMHG | HEIGHT: 63 IN | HEART RATE: 87 BPM | BODY MASS INDEX: 45.71 KG/M2 | WEIGHT: 258 LBS | TEMPERATURE: 98.2 F

## 2024-05-22 DIAGNOSIS — J45.20 MILD INTERMITTENT ASTHMA, UNSPECIFIED WHETHER COMPLICATED: ICD-10-CM

## 2024-05-22 DIAGNOSIS — O46.8X2 SUBCHORIONIC HEMORRHAGE IN SECOND TRIMESTER: ICD-10-CM

## 2024-05-22 DIAGNOSIS — Z87.59 HISTORY OF IUFD: Primary | ICD-10-CM

## 2024-05-22 DIAGNOSIS — Z87.59 HISTORY OF IUFD: ICD-10-CM

## 2024-05-22 PROCEDURE — 76816 OB US FOLLOW-UP PER FETUS: CPT

## 2024-05-22 NOTE — PROGRESS NOTES
Pt reports that she is doing well and denies vaginal bleeding, cramping, contractions or LOF at this time. Reports active fetal movement. Reviewed when to call OB office or present to L&D for evaluation with symptoms such as decreased fetal movement, vaginal bleeding, LOF or ctxs. Pt verbalized understanding. Denies HA, visual changes or epigastric pain. Denies any additional complaints at time of appointment. Next OB appointment scheduled for 05/23/2024.    Vitals:    05/22/24 0908   BP: 119/61   Pulse: 87   Temp: 98.2 °F (36.8 °C)
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
## 2024-05-23 ENCOUNTER — ROUTINE PRENATAL (OUTPATIENT)
Dept: OBSTETRICS AND GYNECOLOGY | Age: 34
End: 2024-05-23
Payer: MEDICAID

## 2024-05-23 VITALS — BODY MASS INDEX: 44.99 KG/M2 | WEIGHT: 254 LBS | SYSTOLIC BLOOD PRESSURE: 124 MMHG | DIASTOLIC BLOOD PRESSURE: 70 MMHG

## 2024-05-23 DIAGNOSIS — F32.A ANXIETY AND DEPRESSION: ICD-10-CM

## 2024-05-23 DIAGNOSIS — Z87.59 HISTORY OF IUFD: ICD-10-CM

## 2024-05-23 DIAGNOSIS — F12.90 MARIJUANA USE DURING PREGNANCY: ICD-10-CM

## 2024-05-23 DIAGNOSIS — O99.320 MARIJUANA USE DURING PREGNANCY: ICD-10-CM

## 2024-05-23 DIAGNOSIS — F41.9 ANXIETY AND DEPRESSION: ICD-10-CM

## 2024-05-23 DIAGNOSIS — Z98.891 PREVIOUS CESAREAN SECTION: ICD-10-CM

## 2024-05-23 DIAGNOSIS — O46.8X9 SUBCHORIONIC HEMORRHAGE OF PLACENTA, ANTEPARTUM: ICD-10-CM

## 2024-05-23 DIAGNOSIS — Z13.89 SCREENING FOR BLOOD OR PROTEIN IN URINE: Primary | ICD-10-CM

## 2024-05-23 DIAGNOSIS — J45.20 MILD INTERMITTENT ASTHMA, UNSPECIFIED WHETHER COMPLICATED: ICD-10-CM

## 2024-05-23 LAB
GLUCOSE UR STRIP-MCNC: NEGATIVE MG/DL
PROT UR STRIP-MCNC: NEGATIVE MG/DL

## 2024-05-23 NOTE — PROGRESS NOTES
CC- pregnancy    HPI: 33 y.o.  at 23w2d.  Patient had ultrasound with maternal-fetal medicine yesterday.  She has noticed some carpal tunnel syndrome symptoms.  Baby is moving.  Patient requested note to sit at work.    EXAM:  Last OB US growth (since 2024)       None          Vitals:    24 1359   BP: 124/70   Weight: 115 kg (254 lb)     Total weight gain for pregnancy:  11.8 kg (26 lb)  Ultrasound with maternal-fetal medicine yesterday showed normal growth at the 40th percentile and abdominal circumference at the 26 percentile.  Doppler heart tones are positive today  Weight gain for pregnancy is high.      A/P  1. Intrauterine pregnancy at 23w2d   2. Pregnancy Risk:  HIGH RISK    Diagnoses and all orders for this visit:    1. Screening for blood or protein in urine (Primary)  -     POC Urinalysis Dipstick    2. Previous  section  Overview:  Repeat  section is scheduled for       3. Subchorionic hemorrhage of placenta, antepartum    4. History of IUFD  Overview:  26 weeks thought to be related to asthma exacerbation.  [facility] recommended starting  testing at 28 weeks.      5. Anxiety and depression    6. Marijuana use during pregnancy  Overview:  Patient reports that she has quit.      7. Mild intermittent asthma, unspecified whether complicated  Overview:  Patient has seen pulmonary.  She has a daily and rescue inhaler.      8. Body mass index (BMI) 40.0-44.9, adult      -----------------------  Repeat fetal weight here in 3 weeks.  Start  testing at 28 weeks.  Start kick counts at 28 weeks  Third trimester testing at visit in 3 weeks  Note will be sent for the patient to sit at work.      Dennis Mckeon MD  2024 17:27 EDT

## 2024-05-30 ENCOUNTER — TELEPHONE (OUTPATIENT)
Dept: OBSTETRICS AND GYNECOLOGY | Age: 34
End: 2024-05-30

## 2024-05-30 ENCOUNTER — TELEMEDICINE (OUTPATIENT)
Dept: PSYCHIATRY | Facility: CLINIC | Age: 34
End: 2024-05-30
Payer: MEDICAID

## 2024-05-30 DIAGNOSIS — F43.10 POST TRAUMATIC STRESS DISORDER (PTSD): ICD-10-CM

## 2024-05-30 DIAGNOSIS — F41.1 GENERALIZED ANXIETY DISORDER: Primary | ICD-10-CM

## 2024-05-30 DIAGNOSIS — F31.81 BIPOLAR II DISORDER: ICD-10-CM

## 2024-05-30 NOTE — PROGRESS NOTES
Date: May 30, 2024  Time In: 14:29 EDT  Time out: 3:29 PM EST    This provider is located at [facility], [facility] [place], [place], [facility], using a secure [employer] Video Visit through [employer]. Patient is being seen remotely via telehealth at their home address is located in [place]. Patient stated they are in a secure environment for this session. The patient's condition being diagnosed and treated is appropriate for telemedicine. The provider identified themself as well as their credentials. The patient, or  patient's legal guardian consent to be seen remotely, and when consent is given they understand that the consent allows for patient identifiable information to be sent to a third party as needed. They may refuse to be seen remotely at any time. The electronic data is encrypted and password protected, and the patient's or  legal guardian has been advised of the potential risks to privacy not withstanding such measures.   PT Identifiers used: Name and .    You have chosen to receive care through a telehealth visit.  Do you consent to use a video/audio connection for your medical care today? Yes    Subjective   Lake Sprague is a 33 y.o. female who presents today for follow up    Chief Complaint:   Chief Complaint   Patient presents with    Anxiety    Depression    Manic Behavior        Data: Pt reported a pretty good month. 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 Pt reported cleaning and taking care of her self this month, feeling good. Pt reported minimal anxiety re pregnancy (24 weeks). Pt denies any alcohol cravings.     Clinical Maneuvering/Intervention: validation, CBT techniques, MI. Assisted patient in processing above session content; acknowledged and normalized patient’s thoughts, feelings, and concerns.  Rationalized patient thought process regarding concerns presented at session.  Discussed triggers  associated with patient's  anxiety  and depression  Also discussed coping skills for patient to implement such as grounding , mindfulness , self care , positive self talk , and reading.    Allowed patient to freely discuss issues without interruption or judgment. Provided safe, confidential environment to facilitate the development of positive therapeutic relationship and encourage open, honest communication. Assisted patient in identifying risk factors which would indicate the need for higher level of care including thoughts to harm self or others and/or self-harming behavior and encouraged patient to contact this office, call 911, or present to the nearest emergency room should any of these events occur. Discussed crisis intervention services and means to access. Patient adamantly and convincingly denies current suicidal or homicidal ideation or perceptual disturbance.    Assessment: Pt arrived on time to session, she was alert and oriented x3. Pt appears to have manageable MH symptoms. Pt is struggling with being comfortable and energy due to pregnancy. Pt has some stressors that she is managing and being mindful of how much energy she puts into stressors.    Patient appears to maintain relative stability as compared to their baseline.  However, patient continues to struggle with   Chief Complaint   Patient presents with    Anxiety    Depression    Manic Behavior    which continues to cause impairment in important areas of functioning.  A result, they can be reasonably expected to continue to benefit from treatment and would likely be at increased risk for decompensation otherwise.      Mental Status Exam:   Hygiene:   good  Cooperation:  Cooperative  Eye Contact:  Good  Psychomotor Behavior:  Appropriate  Affect:  Appropriate  Mood: normal  Speech:  Normal  Thought Process:  Linear  Thought Content:  Normal  Suicidal:  None  Homicidal:  None  Hallucinations:  None  Delusion:  None  Memory:  Intact  Orientation:   Person, Place, and Time  Reliability:  fair  Insight:  Fair  Judgement:  Fair  Impulse Control:  Fair  Physical/Medical Issues:  No        Patient's Support Network Includes:  significant other    Functional Status: No impairment    Progress toward goal: Not at goal    Prognosis: Fair with Ongoing Treatment     Plan: Complete tx plan next session.    Patient will continue in individual outpatient therapy with focus on improved functioning and coping skills, maintaining stability, and avoiding decompensation and the need for higher level of care.    Patient will adhere to medication regimen as prescribed and report any side effects. Patient will contact this office, call 911 or present to the nearest emergency room should suicidal or homicidal ideations occur. Provide Cognitive Behavioral Therapy and Solution Focused Therapy to improve functioning, maintain stability, and avoid decompensation and the need for higher level of care.     Return in about 4 weeks (around 6/27/2024).      VISIT DIAGNOSIS:    Diagnosis Plan   1. Generalized anxiety disorder        2. Bipolar II disorder        3. Post traumatic stress disorder (PTSD)         14:29 EDT         This document has been electronically signed by Lay Le LCSW  May 30, 2024      Part of this note may be an electronic transcription/translation of spoken language to printed text using the Dragon Dictation System.

## 2024-05-30 NOTE — TELEPHONE ENCOUNTER
Caller: Lake Sprague    Relationship to patient: Self    Best call back number: [phone]    Chief complaint: OB F/U & US    Type of visit: OB FU & US    Requested date: 6/13/24 LATER IN AFTERNOON     If rescheduling, when is the original appointment: 6/13/24     Additional notes:PT HAS BEEN CALLED INTO WORK AT THE TIME OF HER APPT - SHE IS ASKING IF SHE CAN SKIP THIS APPT AND SEE  ON THE 27TH?

## 2024-06-13 ENCOUNTER — TELEMEDICINE (OUTPATIENT)
Dept: PSYCHIATRY | Facility: CLINIC | Age: 34
End: 2024-06-13
Payer: MEDICAID

## 2024-06-13 ENCOUNTER — ROUTINE PRENATAL (OUTPATIENT)
Dept: OBSTETRICS AND GYNECOLOGY | Age: 34
End: 2024-06-13
Payer: MEDICAID

## 2024-06-13 VITALS — SYSTOLIC BLOOD PRESSURE: 116 MMHG | DIASTOLIC BLOOD PRESSURE: 74 MMHG | WEIGHT: 262 LBS | BODY MASS INDEX: 46.41 KG/M2

## 2024-06-13 DIAGNOSIS — F12.90 MARIJUANA USE DURING PREGNANCY: ICD-10-CM

## 2024-06-13 DIAGNOSIS — Z13.89 SCREENING FOR BLOOD OR PROTEIN IN URINE: Primary | ICD-10-CM

## 2024-06-13 DIAGNOSIS — Z98.891 PREVIOUS CESAREAN SECTION: ICD-10-CM

## 2024-06-13 DIAGNOSIS — Z87.59 HISTORY OF IUFD: ICD-10-CM

## 2024-06-13 DIAGNOSIS — O99.320 MARIJUANA USE DURING PREGNANCY: ICD-10-CM

## 2024-06-13 DIAGNOSIS — J45.20 MILD INTERMITTENT ASTHMA, UNSPECIFIED WHETHER COMPLICATED: ICD-10-CM

## 2024-06-13 DIAGNOSIS — F41.1 GENERALIZED ANXIETY DISORDER: Primary | ICD-10-CM

## 2024-06-13 DIAGNOSIS — F32.A ANXIETY AND DEPRESSION: ICD-10-CM

## 2024-06-13 DIAGNOSIS — F43.10 POST TRAUMATIC STRESS DISORDER (PTSD): ICD-10-CM

## 2024-06-13 DIAGNOSIS — F31.81 BIPOLAR II DISORDER: ICD-10-CM

## 2024-06-13 DIAGNOSIS — F33.1 MAJOR DEPRESSIVE DISORDER, RECURRENT EPISODE, MODERATE: ICD-10-CM

## 2024-06-13 DIAGNOSIS — F41.9 ANXIETY AND DEPRESSION: ICD-10-CM

## 2024-06-13 PROBLEM — Z00.00 ENCOUNTER FOR HEALTH MAINTENANCE EXAMINATION IN ADULT: Status: RESOLVED | Noted: 2024-03-12 | Resolved: 2024-06-13

## 2024-06-13 LAB
GLUCOSE UR STRIP-MCNC: NEGATIVE MG/DL
PROT UR STRIP-MCNC: NEGATIVE MG/DL

## 2024-06-13 NOTE — TREATMENT PLAN
Multi-Disciplinary Problems (from Behavioral Health Treatment Plan)      Active Problems       Problem: Anxiety  Start Date: 06/13/24      Problem Details: The patient self-scales this problem as a 5 with 10 being the worst.          Goal Priority Start Date Expected End Date End Date    Patient will develop and implement behavioral and cognitive strategies to reduce anxiety and irrational fears. -- 06/13/24 -- --    Goal Details: Progress toward goal:  Not appropriate to rate progress toward goal since this is the initial treatment plan.          Goal Intervention Frequency Start Date End Date    Help patient explore past emotional issues in relation to present anxiety. PRN 06/13/24 --    Intervention Details: Duration of treatment until until discharged.          Goal Intervention Frequency Start Date End Date    Help patient develop an awareness of their cognitive and physical responses to anxiety. PRN 06/13/24 --    Intervention Details: Duration of treatment until until discharged.                  Problem: Depression  Start Date: 06/13/24      Problem Details: The patient self-scales this problem as a 7 with 10 being the worst.          Goal Priority Start Date Expected End Date End Date    Patient will demonstrate the ability to initiate new constructive life skills outside of sessions on a consistent basis. -- 06/13/24 -- --    Goal Details: Progress toward goal:  Not appropriate to rate progress toward goal since this is the initial treatment plan.          Goal Intervention Frequency Start Date End Date    Assist patient in setting attainable activities of daily living goals. PRN 06/13/24 --      Goal Intervention Frequency Start Date End Date    Provide education about depression PRN 06/13/24 --    Intervention Details: Duration of treatment until until discharged.          Goal Intervention Frequency Start Date End Date    Assist patient in developing healthy coping strategies. PRN 06/13/24 --     Intervention Details: Duration of treatment until until discharged.                  Problem: Post Traumatic Stress  Start Date: 06/13/24      Problem Details: The patient self-scales this problem as a 6 with 10 being the worst.          Goal Priority Start Date Expected End Date End Date    Patient will process and move through trauma in a way that improves self regard and the patients ability to function optimally in the world around them. -- 06/13/24 -- --    Goal Details: Progress toward goal:  Not appropriate to rate progress toward goal since this is the initial treatment plan.          Goal Intervention Frequency Start Date End Date    Assist patient in identifying ways that trauma has negatively impacted their view of themselves and the world. PRN 06/13/24 --    Intervention Details: Duration of treatment until until discharged.          Goal Intervention Frequency Start Date End Date    Process trauma in the context of the safe session environment. PRN 06/13/24 --    Intervention Details: Duration of treatment until until discharged.          Goal Intervention Frequency Start Date End Date    Develop a plan of behavior changes that will reduce the stress of the trauma. PRN 06/13/24 --    Intervention Details: Duration of treatment until until discharged.                  Problem: Mood Disorder  Start Date: 06/13/24      Problem Details: The patient self-scales this problem as a 4 with 10 being the worst.        Goal Priority Start Date Expected End Date End Date    Decrease impulsivity in action- that is, not engaging in self-destructive behavious such as overspending, promiscuity, substance abuse, or use of profane language. -- 06/13/24 -- --    Goal Details: Progress toward goal:  Not appropriate to rate progress toward goal since this is the initial treatment plan.        Goal Intervention Frequency Start Date End Date    Provide structure and focus to patients thoughts and action by establishing plans  and routine. PRN 06/13/24 --    Intervention Details: Duration of treatment until until discharged.        Goal Intervention Frequency Start Date End Date    Assist patient in setting reasonable goals and limits on behavior. PRN 06/13/24 --    Intervention Details: Duration of treatment until until discharged.                        Reviewed By       Lay Le LCSW 06/13/24 3992                     I have discussed and reviewed this treatment plan with the patient.

## 2024-06-13 NOTE — PROGRESS NOTES
CC- pregnancy    HPI: 33 y.o.  at 26w2d.  Patient is feeling good fetal movements.  She is having carpal tunnel syndrome.  She is wearing her splints at night and taking Tylenol at night.  She was worried about taking too much Tylenol.  Her asthma is under good control with the Flovent.  She brought in some paperwork for her breast pump today.    EXAM:  Last OB US growth (since 2024)         Value Time User    EFW%ILE  75%ile 2024 12:22 PM Dennis Mckeon MD    AC%ILE  82%ile 2024 12:22 PM Dennis Mckeon MD          Vitals:    24 1145   BP: 116/74   Weight: 119 kg (262 lb)     Total weight gain for pregnancy:  15.4 kg (34 lb)  Weight gain for pregnancy is high  Ultrasound today shows normal fetal growth with an estimated fetal weight of 2 pounds 5 ounces at the 75th percentile.  Abdominal circumference at the 82nd percentile.  Baby is vertex.  SAM is normal at 16  Patient is in no distress and breathing is unlabored      A/P  1. Intrauterine pregnancy at 26w2d   2. Pregnancy Risk:  HIGH RISK    Diagnoses and all orders for this visit:    1. Screening for blood or protein in urine (Primary)  -     POC Urinalysis Dipstick    2. History of IUFD  Overview:  26 weeks thought to be related to asthma exacerbation.  [facility] recommended starting  testing at 28 weeks.      3. Previous  section  Overview:  Repeat  section is scheduled for       4. Anxiety and depression  Overview:  Patient is on Zoloft doing well.      5. Mild intermittent asthma, unspecified whether complicated  Overview:  Patient has seen pulmonary.  She has a daily and rescue inhaler.      6. Marijuana use during pregnancy  Overview:  Patient reports that she has quit.        -----------------------  Third trimester labs and start BPP's in 2 weeks  We discussed kick counting  Continue wrist splints for carpal tunnel.  Patient can try Tylenol PM  Continue inhalers   is scheduled  We discussed hospital  stay for .      Dennis Mckeon MD  2024 12:25 EDT

## 2024-06-13 NOTE — PROGRESS NOTES
Date: 2024  Time In: 13:01 EDT  Time out: 1:55 PM EST    This provider is located at [facility] [place], [place], [facility], using a secure [employer] Video Visit through [employer]. Patient is being seen remotely via telehealth at their home address is located in [place]. Patient stated they are in a secure environment for this session. The patient's condition being diagnosed and treated is appropriate for telemedicine. The provider identified themself as well as their credentials. The patient, or  patient's legal guardian consent to be seen remotely, and when consent is given they understand that the consent allows for patient identifiable information to be sent to a third party as needed. They may refuse to be seen remotely at any time. The electronic data is encrypted and password protected, and the patient's or  legal guardian has been advised of the potential risks to privacy not withstanding such measures.   PT Identifiers used: Name and .    You have chosen to receive care through a telehealth visit.  Do you consent to use a video/audio connection for your medical care today? Yes    Subjective   Lake Sprague is a 33 y.o. female who presents today for follow up    Chief Complaint:   Chief Complaint   Patient presents with    Anxiety    Depression    PTSD        Data: Pt reported overall doing well. Pt reported increased tiredness due to pregnancy. Pt will start weekly appointments with her OBGYN soon due to high risk pregnancy. Pt reported some anxiety re pregnancy as she had a miscarriage at 25.5w previously. Pt is now 26w pregnancy. Pts trauma stems from miscarriage. Pt reported some issues with partner and their communication. Pt reported having some concerns with having post partum depression after birth in August.       Clinical Maneuvering/Intervention: CBT techniques, validation, processing. Assisted patient in processing above session content; acknowledged  and normalized patient’s thoughts, feelings, and concerns.  Rationalized patient thought process regarding concerns presented at session.  Discussed triggers associated with patient's  anxiety , depression , and PTSD Also discussed coping skills for patient to implement such as grounding , mindfulness , self care , and positive self talk     Allowed patient to freely discuss issues without interruption or judgment. Provided safe, confidential environment to facilitate the development of positive therapeutic relationship and encourage open, honest communication. Assisted patient in identifying risk factors which would indicate the need for higher level of care including thoughts to harm self or others and/or self-harming behavior and encouraged patient to contact this office, call 911, or present to the nearest emergency room should any of these events occur. Discussed crisis intervention services and means to access. Patient adamantly and convincingly denies current suicidal or homicidal ideation or perceptual disturbance.    Assessment: Pt arrived on time to session, she was alert and oriented x3. Pts mood appears manageable. Pt has some anxiety in re to past trauma and pregnancy. Pt appears to remove self from negative interaction with partner as needed when communication is poor. Pt identified getting her new kitten was manic behavior. Pt appears mindful and aware of this. Pt appears motivated to improve MH and overall quality of life.    Patient appears to maintain relative stability as compared to their baseline.  However, patient continues to struggle with   Chief Complaint   Patient presents with    Anxiety    Depression    PTSD    which continues to cause impairment in important areas of functioning.  A result, they can be reasonably expected to continue to benefit from treatment and would likely be at increased risk for decompensation otherwise.      Mental Status Exam:   Hygiene:   good  Cooperation:   Cooperative  Eye Contact:  Good  Psychomotor Behavior:  Appropriate  Affect:  Appropriate  Mood: normal  Speech:  Normal  Thought Process:  Linear  Thought Content:  Normal  Suicidal:  None  Homicidal:  None  Hallucinations:  None  Delusion:  None  Memory:  Intact  Orientation:  Person, Place, and Time  Reliability:  fair  Insight:  Fair  Judgement:  Fair  Impulse Control:  Fair  Physical/Medical Issues:  No        Patient's Support Network Includes:   friends    Functional Status: No impairment    Progress toward goal: Not at goal    Prognosis: Fair with Ongoing Treatment     Plan:     Patient will continue in individual outpatient therapy with focus on improved functioning and coping skills, maintaining stability, and avoiding decompensation and the need for higher level of care.    Patient will adhere to medication regimen as prescribed and report any side effects. Patient will contact this office, call 911 or present to the nearest emergency room should suicidal or homicidal ideations occur. Provide Cognitive Behavioral Therapy and Solution Focused Therapy to improve functioning, maintain stability, and avoid decompensation and the need for higher level of care.     Return in about 4 weeks (around 7/11/2024).      VISIT DIAGNOSIS:    Diagnosis Plan   1. Generalized anxiety disorder        2. Bipolar II disorder        3. Post traumatic stress disorder (PTSD)        4. Major depressive disorder, recurrent episode, moderate         13:01 EDT         This document has been electronically signed by Lay Le LCSW  June 13, 2024      Part of this note may be an electronic transcription/translation of spoken language to printed text using the Dragon Dictation System.

## 2024-06-25 ENCOUNTER — PATIENT OUTREACH (OUTPATIENT)
Dept: LABOR AND DELIVERY | Facility: HOSPITAL | Age: 34
End: 2024-06-25
Payer: MEDICAID

## 2024-06-25 NOTE — OUTREACH NOTE
[facility]  Unable to Reach       Questions/Answers      Flowsheet Row Responses   Pending Outreach Prenatal Check-in   Call Attempt First   Outcome Left message            Left vm, encouraged to reach out with any needs. Will call again at a later date.      Олег Ndiaye RN  Maternity Nurse Navigator    6/25/2024, 12:25 EDT

## 2024-06-27 ENCOUNTER — ROUTINE PRENATAL (OUTPATIENT)
Dept: OBSTETRICS AND GYNECOLOGY | Age: 34
End: 2024-06-27
Payer: MEDICAID

## 2024-06-27 VITALS — DIASTOLIC BLOOD PRESSURE: 74 MMHG | WEIGHT: 264 LBS | SYSTOLIC BLOOD PRESSURE: 112 MMHG | BODY MASS INDEX: 46.77 KG/M2

## 2024-06-27 DIAGNOSIS — F41.9 ANXIETY AND DEPRESSION: ICD-10-CM

## 2024-06-27 DIAGNOSIS — F32.A ANXIETY AND DEPRESSION: ICD-10-CM

## 2024-06-27 DIAGNOSIS — Z98.891 PREVIOUS CESAREAN SECTION: ICD-10-CM

## 2024-06-27 DIAGNOSIS — J45.20 MILD INTERMITTENT ASTHMA, UNSPECIFIED WHETHER COMPLICATED: ICD-10-CM

## 2024-06-27 DIAGNOSIS — Z13.89 SCREENING FOR BLOOD OR PROTEIN IN URINE: Primary | ICD-10-CM

## 2024-06-27 DIAGNOSIS — Z87.59 HISTORY OF IUFD: ICD-10-CM

## 2024-06-27 DIAGNOSIS — F12.90 MARIJUANA USE DURING PREGNANCY: ICD-10-CM

## 2024-06-27 DIAGNOSIS — O46.8X9 SUBCHORIONIC HEMORRHAGE OF PLACENTA, ANTEPARTUM: ICD-10-CM

## 2024-06-27 DIAGNOSIS — O99.320 MARIJUANA USE DURING PREGNANCY: ICD-10-CM

## 2024-06-27 DIAGNOSIS — Z3A.28 28 WEEKS GESTATION OF PREGNANCY: ICD-10-CM

## 2024-06-27 DIAGNOSIS — Z13.1 SCREENING FOR DIABETES MELLITUS: ICD-10-CM

## 2024-06-27 DIAGNOSIS — Z13.0 SCREENING FOR IRON DEFICIENCY ANEMIA: ICD-10-CM

## 2024-06-27 PROBLEM — O99.210 OBESITY IN PREGNANCY, ANTEPARTUM: Status: ACTIVE | Noted: 2024-06-27

## 2024-06-27 LAB
GLUCOSE UR STRIP-MCNC: NEGATIVE MG/DL
PROT UR STRIP-MCNC: NEGATIVE MG/DL

## 2024-06-27 PROCEDURE — 99214 OFFICE O/P EST MOD 30 MIN: CPT | Performed by: OBSTETRICS & GYNECOLOGY

## 2024-06-27 PROCEDURE — 90715 TDAP VACCINE 7 YRS/> IM: CPT | Performed by: OBSTETRICS & GYNECOLOGY

## 2024-06-27 PROCEDURE — 90471 IMMUNIZATION ADMIN: CPT | Performed by: OBSTETRICS & GYNECOLOGY

## 2024-06-27 NOTE — PROGRESS NOTES
CC- pregnancy    HPI: 33 y.o.  at 28w2d.  Patient is feeling good fetal movements.  She is using her wrist splints.  Her asthma is under good control.  She has a breast pump.    EXAM:  Last OB US growth (since 2024)         Value Time User    EFW%ILE  75%ile 2024 12:22 PM Dennis Mckeon MD    AC%ILE  82%ile 2024 12:22 PM Dennis Mckeon MD          Vitals:    24 1142   BP: 112/74   Weight: 120 kg (264 lb)     Total weight gain for pregnancy:  16.3 kg (36 lb)  Biophysical profile is 8 out of 8.  SAM is normal at 13.  Baby is vertex  No proteinuria today      A/P  1. Intrauterine pregnancy at 28w2d   2. Pregnancy Risk:  HIGH RISK    Diagnoses and all orders for this visit:    1. Screening for blood or protein in urine (Primary)  -     POC Urinalysis Dipstick    2. Screening for iron deficiency anemia  -     CBC (No Diff)    3. Screening for diabetes mellitus  -     Gestational Screen 1 Hr ([facility])    4. 28 weeks gestation of pregnancy  -     RPR, Rfx Qn RPR / Confirm TP ([facility])    5. Previous  section  Overview:  Repeat  section is scheduled for       6. History of IUFD  Overview:  26 weeks thought to be related to asthma exacerbation.  [facility] recommended starting  testing at 28 weeks.      7. Subchorionic hemorrhage of placenta, antepartum    8. Anxiety and depression  Overview:  Patient is on Zoloft doing well.      9. Mild intermittent asthma, unspecified whether complicated  Overview:  Patient has seen pulmonary.  She has a daily and rescue inhaler.      10. Marijuana use during pregnancy  Overview:  Patient reports that she has quit.      Other orders  -     Tdap Vaccine => 6yo IM (BOOSTRIX/ADACEL)      -----------------------  Continue weekly BPP's and kick counts due to history of fetal demise  Continue Zoloft  Continue inhalers   section is scheduled for   Patient will be out of town next week but will return in 2 weeks.  We will do fetal weight and  BP at that visit.      Dennis Mckeon MD  6/27/2024 13:13 EDT

## 2024-06-28 LAB
ERYTHROCYTE [DISTWIDTH] IN BLOOD BY AUTOMATED COUNT: 12.5 % (ref 11.7–15.4)
GLUCOSE 1H P 50 G GLC PO SERPL-MCNC: 93 MG/DL (ref 70–139)
HCT VFR BLD AUTO: 32.8 % (ref 34–46.6)
HGB BLD-MCNC: 10.8 G/DL (ref 11.1–15.9)
MCH RBC QN AUTO: 28.4 PG (ref 26.6–33)
MCHC RBC AUTO-ENTMCNC: 32.9 G/DL (ref 31.5–35.7)
MCV RBC AUTO: 86 FL (ref 79–97)
PLATELET # BLD AUTO: 318 X10E3/UL (ref 150–450)
RBC # BLD AUTO: 3.8 X10E6/UL (ref 3.77–5.28)
RPR SER QL: NON REACTIVE
WBC # BLD AUTO: 10.9 X10E3/UL (ref 3.4–10.8)

## 2024-06-28 RX ORDER — FERROUS SULFATE 325(65) MG
325 TABLET ORAL
Qty: 30 TABLET | Refills: 3 | Status: SHIPPED | OUTPATIENT
Start: 2024-06-28

## 2024-07-08 ENCOUNTER — PATIENT OUTREACH (OUTPATIENT)
Dept: LABOR AND DELIVERY | Facility: HOSPITAL | Age: 34
End: 2024-07-08
Payer: MEDICAID

## 2024-07-08 NOTE — OUTREACH NOTE
[facility]  Check-In    Current Estimated Gestational Age: 29w6d      Questions/Answers      Flowsheet Row Responses   Best Method for Contacting Cell   Demographics Reviewed Yes   Currently Employed Yes   Able to keep appointments as scheduled Yes   Gender(s) and Name(s) m   Baby Active/Feeling Fetal Movemen Yes   How are you presently feeling? good   Questions regarding prenatal visits or tests to be ordered? No   Education related to new diagnoses/home equipment No   Supplies ready for baby Breast Pump   Resource/Environmental Concerns None   Do you have any questions related to your care experience, your pregnancy, plans for delivery, any concerns, etc? No   Other Education [facility] Benefits          Pt doing well today, in [place] for baby shower.  Plans to call [facility] for appt.  Has breast pump.  Feeling a lot of FM.  Maternal warning s/s reviewed, pt verbalized understanding.  Encouraged to reach out with any needs.  Will follow up again around 35wks.  28wk packet sent.    Олег Ndiaye RN  Maternity Nurse Navigator    7/8/2024, 10:52 EDT

## 2024-07-11 ENCOUNTER — ROUTINE PRENATAL (OUTPATIENT)
Dept: OBSTETRICS AND GYNECOLOGY | Age: 34
End: 2024-07-11
Payer: MEDICAID

## 2024-07-11 ENCOUNTER — TELEPHONE (OUTPATIENT)
Dept: OBSTETRICS AND GYNECOLOGY | Age: 34
End: 2024-07-11

## 2024-07-11 VITALS — BODY MASS INDEX: 46.77 KG/M2 | SYSTOLIC BLOOD PRESSURE: 126 MMHG | DIASTOLIC BLOOD PRESSURE: 74 MMHG | WEIGHT: 264 LBS

## 2024-07-11 DIAGNOSIS — Z13.89 SCREENING FOR BLOOD OR PROTEIN IN URINE: Primary | ICD-10-CM

## 2024-07-11 DIAGNOSIS — F41.9 ANXIETY AND DEPRESSION: ICD-10-CM

## 2024-07-11 DIAGNOSIS — Z87.59 HISTORY OF IUFD: ICD-10-CM

## 2024-07-11 DIAGNOSIS — F32.A ANXIETY AND DEPRESSION: ICD-10-CM

## 2024-07-11 DIAGNOSIS — O99.210 OBESITY IN PREGNANCY, ANTEPARTUM: ICD-10-CM

## 2024-07-11 DIAGNOSIS — J45.20 MILD INTERMITTENT ASTHMA, UNSPECIFIED WHETHER COMPLICATED: ICD-10-CM

## 2024-07-11 DIAGNOSIS — Z98.891 PREVIOUS CESAREAN SECTION: ICD-10-CM

## 2024-07-11 LAB
GLUCOSE UR STRIP-MCNC: NEGATIVE MG/DL
PROT UR STRIP-MCNC: NEGATIVE MG/DL

## 2024-07-11 NOTE — PROGRESS NOTES
CC- pregnancy    HPI: 33 y.o.  at 30w2d patient is feeling great fetal movement.  She has been using an anabel to track it.  She does get very tired at work and would like a note to have a chair to intermittently sit down.  She works at [employer].    EXAM:  Last OB US growth (since 2024)         Value Time User    EFW%ILE  56%ile 2024 10:30 AM Dennis Mckeon MD    AC%ILE  70%ile 2024 10:30 AM Dennis Mckeon MD          Vitals:    24 1008   BP: 126/74   Weight: 120 kg (264 lb)     Total weight gain for pregnancy:  16.3 kg (36 lb)  Ultrasound shows an estimated fetal weight of 3 pounds 10 ounces at the 56 percentile.  Abdominal circumference of the 70th percentile.  Baby is vertex.  BPP is 8 out of 8.  No proteinuria today  Patient had anemia at last check.  Her glucose tolerance test was normal.    A/P  1. Intrauterine pregnancy at 30w2d   2. Pregnancy Risk:  HIGH RISK    Diagnoses and all orders for this visit:    1. Screening for blood or protein in urine (Primary)  -     POC Urinalysis Dipstick    2. Previous  section  Overview:  Repeat  section is scheduled for       3. History of IUFD  Overview:  26 weeks thought to be related to asthma exacerbation.  [facility] recommended starting  testing at 28 weeks.      4. Obesity in pregnancy, antepartum    5. Anxiety and depression  Overview:  Patient is on Zoloft doing well.      6. Mild intermittent asthma, unspecified whether complicated  Overview:  Patient has seen pulmonary.  She has a daily and rescue inhaler.        -----------------------  Fetal weight is normal.  Recheck in 4 weeks.  Continue weekly BPP's and kick counts due to history of fetal demise  Continue Zoloft.  Continue inhalers for asthma  Repeat  section is scheduled for   Work note given for patient to have a chair while working.      Dennis Mckeon MD  2024 10:35 EDT

## 2024-07-11 NOTE — TELEPHONE ENCOUNTER
Pt is needing to change her 8/8 appt. It is the first day of school and is unable to do 8am. She is wanting to know if she could come in later that day? Dr. Mckeon schedule is full that day and she needs to see Dr. Mckeon. Can we fit her in? Please advise

## 2024-07-16 DIAGNOSIS — F41.9 ANXIETY AND DEPRESSION: ICD-10-CM

## 2024-07-16 DIAGNOSIS — F32.A ANXIETY AND DEPRESSION: ICD-10-CM

## 2024-07-19 ENCOUNTER — ROUTINE PRENATAL (OUTPATIENT)
Dept: OBSTETRICS AND GYNECOLOGY | Age: 34
End: 2024-07-19
Payer: MEDICAID

## 2024-07-19 DIAGNOSIS — Z98.891 PREVIOUS CESAREAN SECTION: ICD-10-CM

## 2024-07-19 DIAGNOSIS — F32.A ANXIETY AND DEPRESSION: ICD-10-CM

## 2024-07-19 DIAGNOSIS — F41.9 ANXIETY AND DEPRESSION: ICD-10-CM

## 2024-07-19 DIAGNOSIS — O99.320 MARIJUANA USE DURING PREGNANCY: ICD-10-CM

## 2024-07-19 DIAGNOSIS — Z87.59 HISTORY OF IUFD: ICD-10-CM

## 2024-07-19 DIAGNOSIS — F12.90 MARIJUANA USE DURING PREGNANCY: ICD-10-CM

## 2024-07-19 DIAGNOSIS — O99.210 OBESITY IN PREGNANCY, ANTEPARTUM: ICD-10-CM

## 2024-07-19 DIAGNOSIS — Z13.89 SCREENING FOR BLOOD OR PROTEIN IN URINE: Primary | ICD-10-CM

## 2024-07-19 PROCEDURE — 99213 OFFICE O/P EST LOW 20 MIN: CPT | Performed by: OBSTETRICS & GYNECOLOGY

## 2024-07-22 VITALS — WEIGHT: 262 LBS | DIASTOLIC BLOOD PRESSURE: 74 MMHG | SYSTOLIC BLOOD PRESSURE: 120 MMHG | BODY MASS INDEX: 46.41 KG/M2

## 2024-07-22 LAB
GLUCOSE UR STRIP-MCNC: NEGATIVE MG/DL
PROT UR STRIP-MCNC: NEGATIVE MG/DL

## 2024-07-22 NOTE — PROGRESS NOTES
CC- pregnancy    HPI: 33 y.o.  at 31w6d   see also written notes under media.  Patient was seen 3 days ago on Friday when there was a nationwide computer shutdown.  Patient reported good fetal movement.  She has some mild aches and pains.  Her asthma is under good control.  She did start her Zoloft and she is doing kick counts.    EXAM:  Last OB US growth (since 3/4/2024)         Value Time User    EFW%ILE  56%ile 2024 10:30 AM Dennis Mckeon MD    AC%ILE  70%ile 2024 10:30 AM Dennis Mckeon MD          Vitals:    24 0817   BP: 120/74   Weight: 119 kg (262 lb)     Total weight gain for pregnancy:  15.4 kg (34 lb)  Ultrasound showed vertex presentation.  BPP was 8 out of 8.  SAM was 16.  No proteinuria  Patient was in good spirits in no distress.    A/P  1. Intrauterine pregnancy at 31w6d   2. Pregnancy Risk:  HIGH RISK    Diagnoses and all orders for this visit:    1. Screening for blood or protein in urine (Primary)  -     POC Urinalysis Dipstick    2. Previous  section  Overview:  Repeat  section is scheduled for       3. History of IUFD  Overview:  26 weeks thought to be related to asthma exacerbation.  [facility] recommended starting  testing at 28 weeks.      4. Obesity in pregnancy, antepartum    5. Anxiety and depression  Overview:  Patient is on Zoloft doing well.      6. Marijuana use during pregnancy  Overview:  Patient reports that she has quit.      7. Body mass index (BMI) 40.0-44.9, adult      -----------------------  Continue BPP's weekly.  Next fetal weight at 34 weeks.  Continue kick counts.  Continue inhalers and Zoloft  Repeat  is scheduled for         Dennis Mckeon MD  2024 13:17 EDT

## 2024-07-25 ENCOUNTER — ROUTINE PRENATAL (OUTPATIENT)
Dept: OBSTETRICS AND GYNECOLOGY | Age: 34
End: 2024-07-25
Payer: MEDICAID

## 2024-07-25 ENCOUNTER — TELEMEDICINE (OUTPATIENT)
Dept: PSYCHIATRY | Facility: CLINIC | Age: 34
End: 2024-07-25
Payer: MEDICAID

## 2024-07-25 VITALS — WEIGHT: 267 LBS | BODY MASS INDEX: 47.3 KG/M2 | SYSTOLIC BLOOD PRESSURE: 118 MMHG | DIASTOLIC BLOOD PRESSURE: 74 MMHG

## 2024-07-25 DIAGNOSIS — Z87.59 HISTORY OF IUFD: ICD-10-CM

## 2024-07-25 DIAGNOSIS — F43.10 POST TRAUMATIC STRESS DISORDER (PTSD): ICD-10-CM

## 2024-07-25 DIAGNOSIS — F31.81 BIPOLAR II DISORDER: ICD-10-CM

## 2024-07-25 DIAGNOSIS — Z98.891 PREVIOUS CESAREAN SECTION: ICD-10-CM

## 2024-07-25 DIAGNOSIS — F41.9 ANXIETY AND DEPRESSION: ICD-10-CM

## 2024-07-25 DIAGNOSIS — F41.1 GENERALIZED ANXIETY DISORDER: Primary | ICD-10-CM

## 2024-07-25 DIAGNOSIS — Z13.89 SCREENING FOR BLOOD OR PROTEIN IN URINE: Primary | ICD-10-CM

## 2024-07-25 DIAGNOSIS — F32.A ANXIETY AND DEPRESSION: ICD-10-CM

## 2024-07-25 DIAGNOSIS — O99.210 OBESITY IN PREGNANCY, ANTEPARTUM: ICD-10-CM

## 2024-07-25 PROBLEM — O99.019 MATERNAL ANEMIA IN PREGNANCY, ANTEPARTUM: Status: ACTIVE | Noted: 2024-07-25

## 2024-07-25 LAB
GLUCOSE UR STRIP-MCNC: NEGATIVE MG/DL
PROT UR STRIP-MCNC: NEGATIVE MG/DL

## 2024-07-25 NOTE — PROGRESS NOTES
Date: 2024  Time In: 08:01 EDT  Time out: 8:55 AM EST    This provider is located at [facility], [facility] [place], [place], [facility], using a secure AV[employer] System[employer]art Video Visit through [employer]. Patient is being seen remotely via telehealth at their home address is located in [place]. Patient stated they are in a secure environment for this session. The patient's condition being diagnosed and treated is appropriate for telemedicine. The provider identified themself as well as their credentials. The patient, or  patient's legal guardian consent to be seen remotely, and when consent is given they understand that the consent allows for patient identifiable information to be sent to a third party as needed. They may refuse to be seen remotely at any time. The electronic data is encrypted and password protected, and the patient's or  legal guardian has been advised of the potential risks to privacy not withstanding such measures.   PT Identifiers used: Name and .    You have chosen to receive care through a telehealth visit.  Do you consent to use a video/audio connection for your medical care today? Yes    Subjective   L[place] is a 33 y.o. female who presents today for follow up    Chief Complaint:   Chief Complaint   Patient presents with    Anxiety    Depression    PTSD        Data: Pt reported having a good trip back to [place] since last visit. Pt was happy to see friends and family. Pt reported her son will return from [place] next week, starts school in 2 weeks. Pt reported some drama with her mom during the trip, not receptive to boundaries. Pt reported some concerns with mom coming to help with the baby after birth next month. Pt reported a 'normal' amount of anxiety re the actual birth process. Pt had a miscarriage around 25w before, PTSD. Pt reported work has been her biggest stressor.       Clinical Maneuvering/Intervention: Validation, processing, communication, CBT  techniques.  Assisted patient in processing above session content; acknowledged and normalized patient’s thoughts, feelings, and concerns.  Rationalized patient thought process regarding concerns presented at session.  Discussed triggers associated with patient's  anxiety , depression , and PTSD Also discussed coping skills for patient to implement such as grounding , mindfulness , self care , and boundaries, communication .    Allowed patient to freely discuss issues without interruption or judgment. Provided safe, confidential environment to facilitate the development of positive therapeutic relationship and encourage open, honest communication. Assisted patient in identifying risk factors which would indicate the need for higher level of care including thoughts to harm self or others and/or self-harming behavior and encouraged patient to contact this office, call 911, or present to the nearest emergency room should any of these events occur. Discussed crisis intervention services and means to access. Patient adamantly and convincingly denies current suicidal or homicidal ideation or perceptual disturbance.    Assessment: Pt was alert and oriented x3. Pt appears to be in good spirits today. Pts concerns with her mother visiting are valid, pts mother does not appear receptive to boundaries that pt has attempted to set. Pt appeared receptive to encouragements to communication techniques to aid with this situation. Pts MH appears manageable through self reports. Pt denies SI or self harm thoughts.     Patient appears to maintain relative stability as compared to their baseline.  However, patient continues to struggle with   Chief Complaint   Patient presents with    Anxiety    Depression    PTSD    which continues to cause impairment in important areas of functioning.  A result, they can be reasonably expected to continue to benefit from treatment and would likely be at increased risk for decompensation  otherwise.      Mental Status Exam:   Hygiene:   good  Cooperation:  Cooperative  Eye Contact:  Good  Psychomotor Behavior:  Appropriate  Affect:  Appropriate  Mood: normal  Speech:  Normal  Thought Process:  Linear  Thought Content:  Normal  Suicidal:  None  Homicidal:  None  Hallucinations:  None  Delusion:  None  Memory:  Intact  Orientation:  Person, Place, and Time  Reliability:  fair  Insight:  Fair  Judgement:  Fair  Impulse Control:  Fair  Physical/Medical Issues:  No        Patient's Support Network Includes:  extended family and friends    Functional Status: No impairment    Progress toward goal: Not at goal    Prognosis: Fair with Ongoing Treatment     Plan:     Patient will continue in individual outpatient therapy with focus on improved functioning and coping skills, maintaining stability, and avoiding decompensation and the need for higher level of care.    Patient will adhere to medication regimen as prescribed and report any side effects. Patient will contact this office, call 911 or present to the nearest emergency room should suicidal or homicidal ideations occur. Provide Cognitive Behavioral Therapy and Solution Focused Therapy to improve functioning, maintain stability, and avoid decompensation and the need for higher level of care.     Return in about 6 weeks (around 9/5/2024).      VISIT DIAGNOSIS:    Diagnosis Plan   1. Generalized anxiety disorder        2. Bipolar II disorder        3. Post traumatic stress disorder (PTSD)         08:01 EDT         This document has been electronically signed by Lay Le LCSW  July 25, 2024      Part of this note may be an electronic transcription/translation of spoken language to printed text using the Dragon Dictation System.

## 2024-07-25 NOTE — PROGRESS NOTES
CC- pregnancy    HPI: 33 y.o.  at 32w2d patient reports she is feeling very active fetal movements.  No complaints today.  No contractions or vaginal bleeding.    EXAM:  Last OB US growth (since 3/7/2024)         Value Time User    EFW%ILE  56%ile 2024 10:30 AM Dennis Mckeon MD    AC%ILE  70%ile 2024 10:30 AM Dennis Mckeon MD          Vitals:    24 1133   BP: 118/74   Weight: 121 kg (267 lb)     Total weight gain for pregnancy:  17.7 kg (39 lb)  Ultrasound shows vertex presentation.  BPP is 8 out of 8.  SAM is normal 11  Weight gain for pregnancy is high  No proteinuria today  Normal blood pressure today    A/P  1. Intrauterine pregnancy at 32w2d   2. Pregnancy Risk:  HIGH RISK    Diagnoses and all orders for this visit:    1. Screening for blood or protein in urine (Primary)  -     POC Urinalysis Dipstick    2. Previous  section  Overview:  Repeat  section is scheduled for       3. History of IUFD  Overview:  26 weeks thought to be related to asthma exacerbation.  [facility] recommended starting  testing at 28 weeks.      4. Obesity in pregnancy, antepartum    5. Anxiety and depression  Overview:  Patient is on Zoloft doing well.      6. Body mass index (BMI) 40.0-44.9, adult      -----------------------  Continue weekly BPPs.  Check fetal weight at 34 weeks   is scheduled for   Recommend kick counts      Dennis Mckeon MD  2024 12:20 EDT

## 2024-07-30 RX ORDER — FERROUS SULFATE 325(65) MG
325 TABLET ORAL
Qty: 30 TABLET | Refills: 3 | Status: SHIPPED | OUTPATIENT
Start: 2024-07-30

## 2024-08-01 ENCOUNTER — ROUTINE PRENATAL (OUTPATIENT)
Dept: OBSTETRICS AND GYNECOLOGY | Age: 34
End: 2024-08-01
Payer: MEDICAID

## 2024-08-01 VITALS — DIASTOLIC BLOOD PRESSURE: 74 MMHG | SYSTOLIC BLOOD PRESSURE: 124 MMHG | BODY MASS INDEX: 48.01 KG/M2 | WEIGHT: 271 LBS

## 2024-08-01 DIAGNOSIS — O99.320 MARIJUANA USE DURING PREGNANCY: ICD-10-CM

## 2024-08-01 DIAGNOSIS — F12.90 MARIJUANA USE DURING PREGNANCY: ICD-10-CM

## 2024-08-01 DIAGNOSIS — Z87.59 HISTORY OF IUFD: ICD-10-CM

## 2024-08-01 DIAGNOSIS — O99.210 OBESITY IN PREGNANCY, ANTEPARTUM: ICD-10-CM

## 2024-08-01 DIAGNOSIS — F41.9 ANXIETY AND DEPRESSION: ICD-10-CM

## 2024-08-01 DIAGNOSIS — Z98.891 PREVIOUS CESAREAN SECTION: ICD-10-CM

## 2024-08-01 DIAGNOSIS — Z3A.33 33 WEEKS GESTATION OF PREGNANCY: Primary | ICD-10-CM

## 2024-08-01 DIAGNOSIS — O99.019 MATERNAL ANEMIA IN PREGNANCY, ANTEPARTUM: ICD-10-CM

## 2024-08-01 DIAGNOSIS — F32.A ANXIETY AND DEPRESSION: ICD-10-CM

## 2024-08-01 LAB
GLUCOSE UR STRIP-MCNC: NEGATIVE MG/DL
PROT UR STRIP-MCNC: ABNORMAL MG/DL

## 2024-08-01 NOTE — PROGRESS NOTES
Chief Complaint   Patient presents with    Routine Prenatal Visit     33 week ob visit with bpp       HPI: 33 y.o.  at 33w2d gestation  She is here for BPP  Is doing pretty well  Notes good FM  BPP today is good,   SAM 14  Good  bpm  Has f/u scheduled next week  Call for any issues    Vitals:    24 1147   BP: 124/74   Weight: 123 kg (271 lb)       ROS:  GI:  Negative  : na  Pulmonary: Negative     A/P  1. Intrauterine pregnancy at 33w2d   2. Pregnancy Risk:  HIGH RISK    Diagnoses and all orders for this visit:    1. 33 weeks gestation of pregnancy (Primary)  -     POC Urinalysis Dipstick, Multipro    2. Marijuana use during pregnancy    3. Body mass index (BMI) 40.0-44.9, adult    4. History of IUFD    5. Previous  section    6. Obesity in pregnancy, antepartum    7. Maternal anemia in pregnancy, antepartum    8. Anxiety and depression  -     sertraline (Zoloft) 50 MG tablet; Take 1 tablet by mouth Daily.  Dispense: 90 tablet; Refill: 2        -----------------------  PLAN:   Return in about 1 week (around 2024) for as scheduled next week with Dr pagan.      NACHO Botello  2024 12:23 EDT

## 2024-08-08 ENCOUNTER — ROUTINE PRENATAL (OUTPATIENT)
Dept: OBSTETRICS AND GYNECOLOGY | Age: 34
End: 2024-08-08
Payer: MEDICAID

## 2024-08-08 VITALS — WEIGHT: 267 LBS | DIASTOLIC BLOOD PRESSURE: 80 MMHG | SYSTOLIC BLOOD PRESSURE: 122 MMHG | BODY MASS INDEX: 47.3 KG/M2

## 2024-08-08 DIAGNOSIS — Z98.891 PREVIOUS CESAREAN SECTION: ICD-10-CM

## 2024-08-08 DIAGNOSIS — O99.210 OBESITY IN PREGNANCY, ANTEPARTUM: ICD-10-CM

## 2024-08-08 DIAGNOSIS — Z87.59 HISTORY OF IUFD: ICD-10-CM

## 2024-08-08 DIAGNOSIS — Z13.89 SCREENING FOR BLOOD OR PROTEIN IN URINE: Primary | ICD-10-CM

## 2024-08-08 DIAGNOSIS — O99.019 MATERNAL ANEMIA IN PREGNANCY, ANTEPARTUM: ICD-10-CM

## 2024-08-08 LAB
GLUCOSE UR STRIP-MCNC: NEGATIVE MG/DL
PROT UR STRIP-MCNC: NEGATIVE MG/DL

## 2024-08-08 NOTE — PROGRESS NOTES
CC- pregnancy    HPI: 33 y.o.  at 34w2d patient is feeling very active fetal movements.  No complaints.    EXAM:  Last OB US growth (since 3/21/2024)         Value Time User    EFW%ILE  61%ile 2024  1:47 PM Dennis Mckeon MD    AC%ILE  93%ile 2024  1:47 PM Dennis Mckeon MD          Vitals:    24 1318   BP: 122/80   Weight: 121 kg (267 lb)     Total weight gain for pregnancy:  17.7 kg (39 lb)  5 physical profile is 8 out of 8.  Baby is vertex.  Estimated fetal weight is 5 pounds 9 ounces at the 61st percentile.  Abdominal circumference is elevated at the 93rd percentile.  No proteinuria today    A/P  1. Intrauterine pregnancy at 34w2d   2. Pregnancy Risk:  HIGH RISK    Diagnoses and all orders for this visit:    1. Screening for blood or protein in urine (Primary)  -     POC Urinalysis Dipstick    2. Previous  section  Overview:  Repeat  section is scheduled for       3. History of IUFD  Overview:  26 weeks thought to be related to asthma exacerbation.  [facility] recommended starting  testing at 28 weeks.      4. Obesity in pregnancy, antepartum    5. Maternal anemia in pregnancy, antepartum    6. Body mass index (BMI) 40.0-44.9, adult      -----------------------  Continue kick counts and weekly BPP's   is scheduled for   Patient does not want the drape lowered at time of   Continue low-dose aspirin.      Dennis Mckeon MD  2024 13:47 EDT

## 2024-08-13 ENCOUNTER — PATIENT OUTREACH (OUTPATIENT)
Dept: LABOR AND DELIVERY | Facility: HOSPITAL | Age: 34
End: 2024-08-13
Payer: MEDICAID

## 2024-08-13 NOTE — OUTREACH NOTE
[facility]  Unable to Reach       Questions/Answers      Flowsheet Row Responses   Pending Outreach Prenatal Check-in   Call Attempt First   Outcome Left message            Left vm, encouraged to reach out with any needs. Will call again at a later date.      Олег Ndiaye RN  Maternity Nurse Navigator    8/13/2024, 11:03 EDT

## 2024-08-15 ENCOUNTER — ROUTINE PRENATAL (OUTPATIENT)
Dept: OBSTETRICS AND GYNECOLOGY | Age: 34
End: 2024-08-15
Payer: MEDICAID

## 2024-08-15 VITALS — WEIGHT: 268 LBS | BODY MASS INDEX: 47.47 KG/M2 | DIASTOLIC BLOOD PRESSURE: 82 MMHG | SYSTOLIC BLOOD PRESSURE: 120 MMHG

## 2024-08-15 DIAGNOSIS — O99.210 OBESITY IN PREGNANCY, ANTEPARTUM: ICD-10-CM

## 2024-08-15 DIAGNOSIS — Z98.891 PREVIOUS CESAREAN SECTION: ICD-10-CM

## 2024-08-15 DIAGNOSIS — Z13.89 SCREENING FOR BLOOD OR PROTEIN IN URINE: Primary | ICD-10-CM

## 2024-08-15 DIAGNOSIS — F32.A ANXIETY AND DEPRESSION: ICD-10-CM

## 2024-08-15 DIAGNOSIS — O99.019 MATERNAL ANEMIA IN PREGNANCY, ANTEPARTUM: ICD-10-CM

## 2024-08-15 DIAGNOSIS — Z87.59 HISTORY OF IUFD: ICD-10-CM

## 2024-08-15 DIAGNOSIS — F41.9 ANXIETY AND DEPRESSION: ICD-10-CM

## 2024-08-15 LAB
GLUCOSE UR STRIP-MCNC: NEGATIVE MG/DL
PROT UR STRIP-MCNC: NEGATIVE MG/DL

## 2024-08-15 NOTE — PROGRESS NOTES
CC- pregnancy    HPI: 33 y.o.  at 35w2d patient is feeling more pelvic pressure.  She feels really good fetal movements.  No complaints today.    EXAM:  Last OB US growth (since 3/28/2024)         Value Time User    EFW%ILE  61%ile 2024  1:47 PM Dennis Mckeon MD    AC%ILE  93%ile 2024  1:47 PM Dennis Mckeon MD          Vitals:    08/15/24 1111   BP: 120/82   Weight: 122 kg (268 lb)     Total weight gain for pregnancy:  18.1 kg (40 lb)  No proteinuria today  Group B strep swab was collected  Cervix is closed thick and high  BPP is 8 out of 8.  Baby is vertex and SAM is normal at 15.    A/P  1. Intrauterine pregnancy at 35w2d   2. Pregnancy Risk:  HIGH RISK    Diagnoses and all orders for this visit:    1. Screening for blood or protein in urine (Primary)  -     POC Urinalysis Dipstick    2. Previous  section  Overview:  Repeat  section is scheduled for     Orders:  -     Strep B Screen - Swab, Vaginal/Rectum    3. History of IUFD  Overview:  26 weeks thought to be related to asthma exacerbation.  [facility] recommended starting  testing at 28 weeks.      4. Obesity in pregnancy, antepartum    5. Maternal anemia in pregnancy, antepartum    6. Anxiety and depression  Overview:  Patient is on Zoloft doing well.      7. Body mass index (BMI) 40.0-44.9, adult      -----------------------  Continue kick counts  Weekly BPP's   in 2 weeks on .      Dennis Mckeon MD  8/15/2024 12:58 EDT

## 2024-08-18 LAB — GP B STREP DNA SPEC QL NAA+PROBE: POSITIVE

## 2024-08-19 PROBLEM — O99.820 GBS (GROUP B STREPTOCOCCUS CARRIER), +RV CULTURE, CURRENTLY PREGNANT: Status: ACTIVE | Noted: 2024-08-19

## 2024-08-20 ENCOUNTER — PATIENT OUTREACH (OUTPATIENT)
Dept: LABOR AND DELIVERY | Facility: HOSPITAL | Age: 34
End: 2024-08-20
Payer: MEDICAID

## 2024-08-20 NOTE — OUTREACH NOTE
[facility]  Unable to Reach       Questions/Answers      Flowsheet Row Responses   Pending Outreach Postpartum Check-in   Call Attempt Second   Outcome Left message, MyChart message sent to patient            Left vm, encouraged to reach out with any needs. Will call again at a later date. Sent 36wk packet.    Олег Ndiaye RN  Maternity Nurse Navigator    8/20/2024, 14:41 EDT

## 2024-08-22 ENCOUNTER — ROUTINE PRENATAL (OUTPATIENT)
Dept: OBSTETRICS AND GYNECOLOGY | Age: 34
End: 2024-08-22
Payer: MEDICAID

## 2024-08-22 VITALS — SYSTOLIC BLOOD PRESSURE: 114 MMHG | BODY MASS INDEX: 48.18 KG/M2 | WEIGHT: 272 LBS | DIASTOLIC BLOOD PRESSURE: 70 MMHG

## 2024-08-22 DIAGNOSIS — J45.20 MILD INTERMITTENT ASTHMA, UNSPECIFIED WHETHER COMPLICATED: ICD-10-CM

## 2024-08-22 DIAGNOSIS — O99.820 GBS (GROUP B STREPTOCOCCUS CARRIER), +RV CULTURE, CURRENTLY PREGNANT: ICD-10-CM

## 2024-08-22 DIAGNOSIS — F31.81 BIPOLAR 2 DISORDER: ICD-10-CM

## 2024-08-22 DIAGNOSIS — O99.019 MATERNAL ANEMIA IN PREGNANCY, ANTEPARTUM: ICD-10-CM

## 2024-08-22 DIAGNOSIS — O99.320 MARIJUANA USE DURING PREGNANCY: ICD-10-CM

## 2024-08-22 DIAGNOSIS — Z13.89 SCREENING FOR BLOOD OR PROTEIN IN URINE: Primary | ICD-10-CM

## 2024-08-22 DIAGNOSIS — F12.90 MARIJUANA USE DURING PREGNANCY: ICD-10-CM

## 2024-08-22 DIAGNOSIS — O99.210 OBESITY IN PREGNANCY, ANTEPARTUM: ICD-10-CM

## 2024-08-22 DIAGNOSIS — Z87.59 HISTORY OF IUFD: ICD-10-CM

## 2024-08-22 DIAGNOSIS — Z98.891 PREVIOUS CESAREAN SECTION: ICD-10-CM

## 2024-08-22 LAB
GLUCOSE UR STRIP-MCNC: NEGATIVE MG/DL
PROT UR STRIP-MCNC: NEGATIVE MG/DL

## 2024-08-22 NOTE — PROGRESS NOTES
CC- pregnancy    HPI: 33 y.o.  at 36w2d patient feels well with no complaints.  Baby is very active.  No contractions.  She had 1 episode last week where she felt a little lightheaded but it resolved.    EXAM:  Last OB US growth (since 2024)         Value Time User    EFW%ILE  61%ile 2024  1:47 PM Dennis Mckeon MD    AC%ILE  93%ile 2024  1:47 PM Dennis Mckeon MD          Vitals:    24 0938   BP: 114/70   Weight: 123 kg (272 lb)     Total weight gain for pregnancy:  20 kg (44 lb)  Biophysical profile is 8 out of 8.  Baby is vertex.  SAM is normal at 13  Patient has no acute distress and breathing is normal    A/P  1. Intrauterine pregnancy at 36w2d   2. Pregnancy Risk:  HIGH RISK    Diagnoses and all orders for this visit:    1. Screening for blood or protein in urine (Primary)  -     POC Urinalysis Dipstick    2. Previous  section  Overview:  Repeat  section is scheduled for       3. History of IUFD  Overview:  26 weeks thought to be related to asthma exacerbation.  [facility] recommended starting  testing at 28 weeks.      4. Obesity in pregnancy, antepartum    5. GBS (group B Streptococcus carrier), +RV culture, currently pregnant    6. Maternal anemia in pregnancy, antepartum    7. Bipolar 2 disorder    8. Marijuana use during pregnancy  Overview:  Patient reports that she has quit.      9. Mild intermittent asthma, unspecified whether complicated  Overview:  Patient has seen pulmonary.  She has a daily and rescue inhaler.        -----------------------  Continue kick counts.   is scheduled for 1 week  Patient does not want the drape lowered at time of   Continue low-dose aspirin  We discussed -patient has no questions  We discussed GBS positive.  Treatment only if the patient has rupture membranes prior to .      Dennis Mckeon MD  2024 10:24 EDT

## 2024-08-26 ENCOUNTER — PATIENT OUTREACH (OUTPATIENT)
Dept: LABOR AND DELIVERY | Facility: HOSPITAL | Age: 34
End: 2024-08-26
Payer: MEDICAID

## 2024-08-26 NOTE — OUTREACH NOTE
[facility]  Unable to Reach       Questions/Answers      Flowsheet Row Responses   Pending Outreach Prenatal Check-in   Call Attempt Third   Outcome Left message, MyChart message sent to patient            Left vm and sent [facility] msg, encouraged to reach out with any needs.     Олег Ndiaye RN  Maternity Nurse Navigator    8/26/2024, 10:05 EDT
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
## 2024-08-29 ENCOUNTER — HOSPITAL ENCOUNTER (INPATIENT)
Facility: HOSPITAL | Age: 34
LOS: 3 days | Discharge: HOME | End: 2024-09-01
Attending: OBSTETRICS & GYNECOLOGY | Admitting: OBSTETRICS & GYNECOLOGY
Payer: MEDICAID

## 2024-08-29 ENCOUNTER — PATIENT OUTREACH (OUTPATIENT)
Dept: LABOR AND DELIVERY | Facility: HOSPITAL | Age: 34
End: 2024-08-29
Payer: MEDICAID

## 2024-08-29 ENCOUNTER — ANESTHESIA EVENT (OUTPATIENT)
Dept: LABOR AND DELIVERY | Facility: HOSPITAL | Age: 34
End: 2024-08-29
Payer: MEDICAID

## 2024-08-29 ENCOUNTER — TELEPHONE (OUTPATIENT)
Dept: OBSTETRICS AND GYNECOLOGY | Age: 34
End: 2024-08-29
Payer: MEDICAID

## 2024-08-29 ENCOUNTER — ANESTHESIA (OUTPATIENT)
Dept: LABOR AND DELIVERY | Facility: HOSPITAL | Age: 34
End: 2024-08-29
Payer: MEDICAID

## 2024-08-29 DIAGNOSIS — Z98.891 PREVIOUS CESAREAN SECTION: ICD-10-CM

## 2024-08-29 DIAGNOSIS — Z87.59 HISTORY OF IUFD: ICD-10-CM

## 2024-08-29 LAB
ABO GROUP BLD: NORMAL
AMPHET+METHAMPHET UR QL: NEGATIVE
BARBITURATES UR QL SCN: NEGATIVE
BENZODIAZ UR QL SCN: NEGATIVE
BLD GP AB SCN SERPL QL: NEGATIVE
CANNABINOIDS SERPL QL: POSITIVE
COCAINE UR QL: NEGATIVE
DEPRECATED RDW RBC AUTO: 41.5 FL (ref 37–54)
ERYTHROCYTE [DISTWIDTH] IN BLOOD BY AUTOMATED COUNT: 13.6 % (ref 12.3–15.4)
FENTANYL UR-MCNC: NEGATIVE NG/ML
HCT VFR BLD AUTO: 33.9 % (ref 34–46.6)
HGB BLD-MCNC: 11.1 G/DL (ref 12–15.9)
MCH RBC QN AUTO: 27.5 PG (ref 26.6–33)
MCHC RBC AUTO-ENTMCNC: 32.7 G/DL (ref 31.5–35.7)
MCV RBC AUTO: 83.9 FL (ref 79–97)
METHADONE UR QL SCN: NEGATIVE
OPIATES UR QL: NEGATIVE
OXYCODONE UR QL SCN: NEGATIVE
PLATELET # BLD AUTO: 324 10*3/MM3 (ref 140–450)
PMV BLD AUTO: 10.4 FL (ref 6–12)
RBC # BLD AUTO: 4.04 10*6/MM3 (ref 3.77–5.28)
RH BLD: POSITIVE
T&S EXPIRATION DATE: NORMAL
TREPONEMA PALLIDUM IGG+IGM AB [PRESENCE] IN SERUM OR PLASMA BY IMMUNOASSAY: NORMAL
WBC NRBC COR # BLD AUTO: 11.59 10*3/MM3 (ref 3.4–10.8)

## 2024-08-29 PROCEDURE — 80307 DRUG TEST PRSMV CHEM ANLYZR: CPT | Performed by: OBSTETRICS & GYNECOLOGY

## 2024-08-29 PROCEDURE — 25010000002 ROPIVACAINE PER 1 MG: Performed by: OBSTETRICS & GYNECOLOGY

## 2024-08-29 PROCEDURE — 86901 BLOOD TYPING SEROLOGIC RH(D): CPT | Performed by: OBSTETRICS & GYNECOLOGY

## 2024-08-29 PROCEDURE — G0480 DRUG TEST DEF 1-7 CLASSES: HCPCS | Performed by: OBSTETRICS & GYNECOLOGY

## 2024-08-29 PROCEDURE — 25010000002 KETOROLAC TROMETHAMINE PER 15 MG: Performed by: OBSTETRICS & GYNECOLOGY

## 2024-08-29 PROCEDURE — 94799 UNLISTED PULMONARY SVC/PX: CPT

## 2024-08-29 PROCEDURE — 86850 RBC ANTIBODY SCREEN: CPT | Performed by: OBSTETRICS & GYNECOLOGY

## 2024-08-29 PROCEDURE — 25010000002 EPINEPHRINE 1 MG/ML SOLUTION 30 ML VIAL: Performed by: OBSTETRICS & GYNECOLOGY

## 2024-08-29 PROCEDURE — 85027 COMPLETE CBC AUTOMATED: CPT | Performed by: OBSTETRICS & GYNECOLOGY

## 2024-08-29 PROCEDURE — 25810000003 LACTATED RINGERS PER 1000 ML: Performed by: OBSTETRICS & GYNECOLOGY

## 2024-08-29 PROCEDURE — 25010000002 ONDANSETRON PER 1 MG: Performed by: ANESTHESIOLOGY

## 2024-08-29 PROCEDURE — 25010000002 CEFAZOLIN 3 G RECONSTITUTED SOLUTION 1 EACH VIAL: Performed by: OBSTETRICS & GYNECOLOGY

## 2024-08-29 PROCEDURE — 59514 CESAREAN DELIVERY ONLY: CPT | Performed by: OBSTETRICS & GYNECOLOGY

## 2024-08-29 PROCEDURE — 25810000003 LACTATED RINGERS SOLUTION: Performed by: OBSTETRICS & GYNECOLOGY

## 2024-08-29 PROCEDURE — 86900 BLOOD TYPING SEROLOGIC ABO: CPT | Performed by: OBSTETRICS & GYNECOLOGY

## 2024-08-29 PROCEDURE — 25010000002 MORPHINE PER 10 MG: Performed by: ANESTHESIOLOGY

## 2024-08-29 PROCEDURE — 63710000001 DIPHENHYDRAMINE PER 50 MG: Performed by: NURSE ANESTHETIST, CERTIFIED REGISTERED

## 2024-08-29 PROCEDURE — 94640 AIRWAY INHALATION TREATMENT: CPT

## 2024-08-29 PROCEDURE — 25010000002 BUPIVACAINE PF 0.75 % SOLUTION: Performed by: ANESTHESIOLOGY

## 2024-08-29 PROCEDURE — 25010000002 FENTANYL CITRATE (PF) 50 MCG/ML SOLUTION: Performed by: ANESTHESIOLOGY

## 2024-08-29 PROCEDURE — 25010000002 CLONIDINE PER 1 MG: Performed by: OBSTETRICS & GYNECOLOGY

## 2024-08-29 PROCEDURE — 25010000002 DIPHENHYDRAMINE PER 50 MG: Performed by: NURSE ANESTHETIST, CERTIFIED REGISTERED

## 2024-08-29 PROCEDURE — 88307 TISSUE EXAM BY PATHOLOGIST: CPT

## 2024-08-29 PROCEDURE — 86780 TREPONEMA PALLIDUM: CPT | Performed by: OBSTETRICS & GYNECOLOGY

## 2024-08-29 RX ORDER — METHYLERGONOVINE MALEATE 0.2 MG/ML
200 INJECTION INTRAVENOUS AS NEEDED
Status: DISCONTINUED | OUTPATIENT
Start: 2024-08-29 | End: 2024-08-29 | Stop reason: HOSPADM

## 2024-08-29 RX ORDER — OXYTOCIN/0.9 % SODIUM CHLORIDE 30/500 ML
125 PLASTIC BAG, INJECTION (ML) INTRAVENOUS ONCE AS NEEDED
Status: COMPLETED | OUTPATIENT
Start: 2024-08-29 | End: 2024-08-29

## 2024-08-29 RX ORDER — DOCUSATE SODIUM 100 MG/1
100 CAPSULE, LIQUID FILLED ORAL 2 TIMES DAILY
Status: DISCONTINUED | OUTPATIENT
Start: 2024-08-29 | End: 2024-09-01 | Stop reason: HOSPADM

## 2024-08-29 RX ORDER — BUDESONIDE 0.5 MG/2ML
0.5 INHALANT ORAL
Status: DISCONTINUED | OUTPATIENT
Start: 2024-08-29 | End: 2024-09-01 | Stop reason: HOSPADM

## 2024-08-29 RX ORDER — ENOXAPARIN SODIUM 100 MG/ML
40 INJECTION SUBCUTANEOUS EVERY 12 HOURS
Status: DISCONTINUED | OUTPATIENT
Start: 2024-08-30 | End: 2024-09-01 | Stop reason: HOSPADM

## 2024-08-29 RX ORDER — CITRIC ACID/SODIUM CITRATE 334-500MG
30 SOLUTION, ORAL ORAL ONCE
Status: COMPLETED | OUTPATIENT
Start: 2024-08-29 | End: 2024-08-29

## 2024-08-29 RX ORDER — SODIUM CHLORIDE 0.9 % (FLUSH) 0.9 %
10 SYRINGE (ML) INJECTION EVERY 12 HOURS SCHEDULED
Status: DISCONTINUED | OUTPATIENT
Start: 2024-08-29 | End: 2024-08-29 | Stop reason: HOSPADM

## 2024-08-29 RX ORDER — OXYTOCIN/0.9 % SODIUM CHLORIDE 30/500 ML
999 PLASTIC BAG, INJECTION (ML) INTRAVENOUS ONCE
Status: COMPLETED | OUTPATIENT
Start: 2024-08-29 | End: 2024-08-29

## 2024-08-29 RX ORDER — HYDROMORPHONE HYDROCHLORIDE 1 MG/ML
0.5 INJECTION, SOLUTION INTRAMUSCULAR; INTRAVENOUS; SUBCUTANEOUS
Status: CANCELLED | OUTPATIENT
Start: 2024-08-29 | End: 2024-08-29

## 2024-08-29 RX ORDER — ONDANSETRON 2 MG/ML
4 INJECTION INTRAMUSCULAR; INTRAVENOUS EVERY 6 HOURS PRN
Status: DISCONTINUED | OUTPATIENT
Start: 2024-08-29 | End: 2024-08-29 | Stop reason: HOSPADM

## 2024-08-29 RX ORDER — SODIUM CHLORIDE, SODIUM LACTATE, POTASSIUM CHLORIDE, CALCIUM CHLORIDE 600; 310; 30; 20 MG/100ML; MG/100ML; MG/100ML; MG/100ML
125 INJECTION, SOLUTION INTRAVENOUS CONTINUOUS
Status: DISCONTINUED | OUTPATIENT
Start: 2024-08-29 | End: 2024-08-29

## 2024-08-29 RX ORDER — ONDANSETRON 2 MG/ML
4 INJECTION INTRAMUSCULAR; INTRAVENOUS ONCE AS NEEDED
Status: DISCONTINUED | OUTPATIENT
Start: 2024-08-29 | End: 2024-09-01 | Stop reason: HOSPADM

## 2024-08-29 RX ORDER — ERYTHROMYCIN 5 MG/G
OINTMENT OPHTHALMIC
Status: ACTIVE
Start: 2024-08-29 | End: 2024-08-29

## 2024-08-29 RX ORDER — KETOROLAC TROMETHAMINE 15 MG/ML
15 INJECTION, SOLUTION INTRAMUSCULAR; INTRAVENOUS EVERY 6 HOURS
Status: COMPLETED | OUTPATIENT
Start: 2024-08-29 | End: 2024-08-30

## 2024-08-29 RX ORDER — MORPHINE SULFATE 2 MG/ML
2 INJECTION, SOLUTION INTRAMUSCULAR; INTRAVENOUS
Status: ACTIVE | OUTPATIENT
Start: 2024-08-29 | End: 2024-08-29

## 2024-08-29 RX ORDER — BUPIVACAINE HYDROCHLORIDE 7.5 MG/ML
INJECTION, SOLUTION EPIDURAL; RETROBULBAR
Status: COMPLETED | OUTPATIENT
Start: 2024-08-29 | End: 2024-08-29

## 2024-08-29 RX ORDER — SIMETHICONE 80 MG
80 TABLET,CHEWABLE ORAL
Status: DISCONTINUED | OUTPATIENT
Start: 2024-08-29 | End: 2024-09-01 | Stop reason: HOSPADM

## 2024-08-29 RX ORDER — ONDANSETRON 2 MG/ML
4 INJECTION INTRAMUSCULAR; INTRAVENOUS ONCE AS NEEDED
Status: COMPLETED | OUTPATIENT
Start: 2024-08-29 | End: 2024-08-29

## 2024-08-29 RX ORDER — DIPHENHYDRAMINE HYDROCHLORIDE 50 MG/ML
25 INJECTION INTRAMUSCULAR; INTRAVENOUS EVERY 4 HOURS PRN
Status: DISCONTINUED | OUTPATIENT
Start: 2024-08-29 | End: 2024-09-01 | Stop reason: HOSPADM

## 2024-08-29 RX ORDER — HYDROCORTISONE 25 MG/G
CREAM TOPICAL 3 TIMES DAILY PRN
Status: DISCONTINUED | OUTPATIENT
Start: 2024-08-29 | End: 2024-09-01 | Stop reason: HOSPADM

## 2024-08-29 RX ORDER — CARBOPROST TROMETHAMINE 250 UG/ML
250 INJECTION, SOLUTION INTRAMUSCULAR AS NEEDED
Status: DISCONTINUED | OUTPATIENT
Start: 2024-08-29 | End: 2024-08-29 | Stop reason: HOSPADM

## 2024-08-29 RX ORDER — ACETAMINOPHEN 500 MG
1000 TABLET ORAL ONCE
Status: COMPLETED | OUTPATIENT
Start: 2024-08-29 | End: 2024-08-29

## 2024-08-29 RX ORDER — ALBUTEROL SULFATE 0.83 MG/ML
2.5 SOLUTION RESPIRATORY (INHALATION) EVERY 4 HOURS PRN
Status: DISCONTINUED | OUTPATIENT
Start: 2024-08-29 | End: 2024-09-01 | Stop reason: HOSPADM

## 2024-08-29 RX ORDER — IBUPROFEN 600 MG/1
600 TABLET, FILM COATED ORAL EVERY 6 HOURS
Status: DISCONTINUED | OUTPATIENT
Start: 2024-08-30 | End: 2024-09-01 | Stop reason: HOSPADM

## 2024-08-29 RX ORDER — SODIUM CHLORIDE 9 MG/ML
40 INJECTION, SOLUTION INTRAVENOUS AS NEEDED
Status: DISCONTINUED | OUTPATIENT
Start: 2024-08-29 | End: 2024-08-29 | Stop reason: HOSPADM

## 2024-08-29 RX ORDER — MORPHINE SULFATE 4 MG/ML
INJECTION, SOLUTION INTRAMUSCULAR; INTRAVENOUS
Status: COMPLETED | OUTPATIENT
Start: 2024-08-29 | End: 2024-08-29

## 2024-08-29 RX ORDER — KETOROLAC TROMETHAMINE 30 MG/ML
30 INJECTION, SOLUTION INTRAMUSCULAR; INTRAVENOUS ONCE
Status: COMPLETED | OUTPATIENT
Start: 2024-08-29 | End: 2024-08-29

## 2024-08-29 RX ORDER — ACETAMINOPHEN 325 MG/1
650 TABLET ORAL EVERY 6 HOURS
Status: DISCONTINUED | OUTPATIENT
Start: 2024-08-30 | End: 2024-09-01 | Stop reason: HOSPADM

## 2024-08-29 RX ORDER — SODIUM CHLORIDE 0.9 % (FLUSH) 0.9 %
10 SYRINGE (ML) INJECTION AS NEEDED
Status: DISCONTINUED | OUTPATIENT
Start: 2024-08-29 | End: 2024-08-29 | Stop reason: HOSPADM

## 2024-08-29 RX ORDER — OXYCODONE HYDROCHLORIDE 5 MG/1
5 TABLET ORAL EVERY 4 HOURS PRN
Status: DISCONTINUED | OUTPATIENT
Start: 2024-08-29 | End: 2024-09-01 | Stop reason: HOSPADM

## 2024-08-29 RX ORDER — NALOXONE HCL 0.4 MG/ML
0.2 VIAL (ML) INJECTION
Status: CANCELLED | OUTPATIENT
Start: 2024-08-29

## 2024-08-29 RX ORDER — ACETAMINOPHEN 500 MG
1000 TABLET ORAL EVERY 6 HOURS
Status: COMPLETED | OUTPATIENT
Start: 2024-08-29 | End: 2024-08-30

## 2024-08-29 RX ORDER — HYDROXYZINE HYDROCHLORIDE 50 MG/1
50 TABLET, FILM COATED ORAL EVERY 6 HOURS PRN
Status: DISCONTINUED | OUTPATIENT
Start: 2024-08-29 | End: 2024-09-01 | Stop reason: HOSPADM

## 2024-08-29 RX ORDER — LIDOCAINE HYDROCHLORIDE 10 MG/ML
0.5 INJECTION, SOLUTION INFILTRATION; PERINEURAL ONCE AS NEEDED
Status: DISCONTINUED | OUTPATIENT
Start: 2024-08-29 | End: 2024-08-29 | Stop reason: HOSPADM

## 2024-08-29 RX ORDER — DIPHENHYDRAMINE HCL 25 MG
25 CAPSULE ORAL EVERY 4 HOURS PRN
Status: DISCONTINUED | OUTPATIENT
Start: 2024-08-29 | End: 2024-09-01 | Stop reason: HOSPADM

## 2024-08-29 RX ORDER — ONDANSETRON 4 MG/1
4 TABLET, ORALLY DISINTEGRATING ORAL EVERY 6 HOURS PRN
Status: DISCONTINUED | OUTPATIENT
Start: 2024-08-29 | End: 2024-08-29 | Stop reason: HOSPADM

## 2024-08-29 RX ORDER — DROPERIDOL 2.5 MG/ML
0.62 INJECTION, SOLUTION INTRAMUSCULAR; INTRAVENOUS
Status: DISCONTINUED | OUTPATIENT
Start: 2024-08-29 | End: 2024-09-01 | Stop reason: HOSPADM

## 2024-08-29 RX ORDER — FAMOTIDINE 10 MG/ML
20 INJECTION, SOLUTION INTRAVENOUS ONCE AS NEEDED
Status: COMPLETED | OUTPATIENT
Start: 2024-08-29 | End: 2024-08-29

## 2024-08-29 RX ORDER — OXYTOCIN/0.9 % SODIUM CHLORIDE 30/500 ML
250 PLASTIC BAG, INJECTION (ML) INTRAVENOUS CONTINUOUS
Status: DISPENSED | OUTPATIENT
Start: 2024-08-29 | End: 2024-08-29

## 2024-08-29 RX ORDER — MISOPROSTOL 200 UG/1
800 TABLET ORAL AS NEEDED
Status: DISCONTINUED | OUTPATIENT
Start: 2024-08-29 | End: 2024-08-29 | Stop reason: HOSPADM

## 2024-08-29 RX ORDER — FENTANYL CITRATE 50 UG/ML
INJECTION, SOLUTION INTRAMUSCULAR; INTRAVENOUS
Status: COMPLETED | OUTPATIENT
Start: 2024-08-29 | End: 2024-08-29

## 2024-08-29 RX ORDER — PHENYLEPHRINE HCL IN 0.9% NACL 1 MG/10 ML
SYRINGE (ML) INTRAVENOUS AS NEEDED
Status: DISCONTINUED | OUTPATIENT
Start: 2024-08-29 | End: 2024-08-29 | Stop reason: SURG

## 2024-08-29 RX ORDER — OXYCODONE HYDROCHLORIDE 10 MG/1
10 TABLET ORAL EVERY 4 HOURS PRN
Status: DISCONTINUED | OUTPATIENT
Start: 2024-08-29 | End: 2024-09-01 | Stop reason: HOSPADM

## 2024-08-29 RX ORDER — ONDANSETRON 2 MG/ML
4 INJECTION INTRAMUSCULAR; INTRAVENOUS EVERY 6 HOURS PRN
Status: DISCONTINUED | OUTPATIENT
Start: 2024-08-29 | End: 2024-09-01 | Stop reason: HOSPADM

## 2024-08-29 RX ORDER — PHYTONADIONE 1 MG/.5ML
INJECTION, EMULSION INTRAMUSCULAR; INTRAVENOUS; SUBCUTANEOUS
Status: ACTIVE
Start: 2024-08-29 | End: 2024-08-29

## 2024-08-29 RX ADMIN — ACETAMINOPHEN 1000 MG: 500 TABLET ORAL at 06:58

## 2024-08-29 RX ADMIN — BUPIVACAINE HYDROCHLORIDE 1.6 ML: 7.5 INJECTION, SOLUTION EPIDURAL; RETROBULBAR at 07:40

## 2024-08-29 RX ADMIN — FENTANYL CITRATE 20 MCG: 50 INJECTION, SOLUTION INTRAMUSCULAR; INTRAVENOUS at 07:40

## 2024-08-29 RX ADMIN — SIMETHICONE 80 MG: 80 TABLET, CHEWABLE ORAL at 23:53

## 2024-08-29 RX ADMIN — SIMETHICONE 80 MG: 80 TABLET, CHEWABLE ORAL at 18:04

## 2024-08-29 RX ADMIN — BUDESONIDE 0.5 MG: 0.5 INHALANT ORAL at 20:09

## 2024-08-29 RX ADMIN — ACETAMINOPHEN 1000 MG: 500 TABLET ORAL at 15:35

## 2024-08-29 RX ADMIN — KETOROLAC TROMETHAMINE 15 MG: 15 INJECTION, SOLUTION INTRAMUSCULAR; INTRAVENOUS at 23:53

## 2024-08-29 RX ADMIN — Medication 200 MCG: at 07:52

## 2024-08-29 RX ADMIN — DIPHENHYDRAMINE HYDROCHLORIDE 25 MG: 25 CAPSULE ORAL at 20:05

## 2024-08-29 RX ADMIN — Medication 999 ML/HR: at 08:08

## 2024-08-29 RX ADMIN — SODIUM CHLORIDE, POTASSIUM CHLORIDE, SODIUM LACTATE AND CALCIUM CHLORIDE 999 ML/HR: 600; 310; 30; 20 INJECTION, SOLUTION INTRAVENOUS at 09:23

## 2024-08-29 RX ADMIN — FAMOTIDINE 20 MG: 10 INJECTION INTRAVENOUS at 06:58

## 2024-08-29 RX ADMIN — Medication 125 ML/HR: at 09:31

## 2024-08-29 RX ADMIN — DIPHENHYDRAMINE HYDROCHLORIDE 25 MG: 50 INJECTION, SOLUTION INTRAMUSCULAR; INTRAVENOUS at 10:37

## 2024-08-29 RX ADMIN — Medication 200 MCG: at 08:27

## 2024-08-29 RX ADMIN — ACETAMINOPHEN 1000 MG: 500 TABLET ORAL at 21:24

## 2024-08-29 RX ADMIN — Medication 100 MCG: at 07:49

## 2024-08-29 RX ADMIN — SODIUM CITRATE AND CITRIC ACID MONOHYDRATE 30 ML: 334; 500 SOLUTION ORAL at 07:19

## 2024-08-29 RX ADMIN — KETOROLAC TROMETHAMINE 30 MG: 30 INJECTION, SOLUTION INTRAMUSCULAR at 09:20

## 2024-08-29 RX ADMIN — SODIUM CHLORIDE, POTASSIUM CHLORIDE, SODIUM LACTATE AND CALCIUM CHLORIDE 1000 ML: 600; 310; 30; 20 INJECTION, SOLUTION INTRAVENOUS at 05:30

## 2024-08-29 RX ADMIN — Medication 200 MCG: at 07:59

## 2024-08-29 RX ADMIN — Medication 200 MCG: at 08:04

## 2024-08-29 RX ADMIN — Medication 100 MCG: at 07:47

## 2024-08-29 RX ADMIN — Medication 200 MCG: at 08:09

## 2024-08-29 RX ADMIN — Medication 100 MCG: at 07:50

## 2024-08-29 RX ADMIN — SODIUM CHLORIDE, POTASSIUM CHLORIDE, SODIUM LACTATE AND CALCIUM CHLORIDE 125 ML/HR: 600; 310; 30; 20 INJECTION, SOLUTION INTRAVENOUS at 06:52

## 2024-08-29 RX ADMIN — Medication 200 MCG: at 07:55

## 2024-08-29 RX ADMIN — Medication 200 MCG: at 08:14

## 2024-08-29 RX ADMIN — Medication 100 MCG: at 07:46

## 2024-08-29 RX ADMIN — ALBUTEROL SULFATE 2.5 MG: 2.5 SOLUTION RESPIRATORY (INHALATION) at 20:08

## 2024-08-29 RX ADMIN — DOCUSATE SODIUM 100 MG: 100 CAPSULE, LIQUID FILLED ORAL at 13:05

## 2024-08-29 RX ADMIN — MORPHINE SULFATE 100 MCG: 4 INJECTION, SOLUTION INTRAMUSCULAR; INTRAVENOUS at 07:40

## 2024-08-29 RX ADMIN — SODIUM CHLORIDE 3000 MG: 900 INJECTION INTRAVENOUS at 07:28

## 2024-08-29 RX ADMIN — KETOROLAC TROMETHAMINE 15 MG: 15 INJECTION, SOLUTION INTRAMUSCULAR; INTRAVENOUS at 18:04

## 2024-08-29 RX ADMIN — SIMETHICONE 80 MG: 80 TABLET, CHEWABLE ORAL at 13:05

## 2024-08-29 RX ADMIN — DOCUSATE SODIUM 100 MG: 100 CAPSULE, LIQUID FILLED ORAL at 20:05

## 2024-08-29 RX ADMIN — CLONIDINE HYDROCHLORIDE 80 ML: 0.1 INJECTION, SOLUTION EPIDURAL at 08:36

## 2024-08-29 RX ADMIN — ONDANSETRON 4 MG: 2 INJECTION, SOLUTION INTRAMUSCULAR; INTRAVENOUS at 06:58

## 2024-08-29 NOTE — OP NOTE
Operative Note    Preoperative diagnosis: 33-year-old  4 para 1-1-1-1 at 37-2/7 weeks with prior  section, desire for repeat  section and history of intrauterine fetal demise    Postoperative diagnosis: Same    Procedure: Repeat  section    Surgeon: Dr. Mckeon    Assistant: Dr. Stanley    Anesthesia: Spinal    Findings: Viable male infant named Omid weighing 7  Pounds 1 ounces, Apgar of  9 and 9 , amniotic fluid was clear.  Placenta was complete with a three-vessel cord.  Ovaries and fallopian tubes appear normal    Quantitative blood loss:  Please see epic record    Specimens: placenta to pathology    Medications: Preoperative antibiotics were given-2 g of Ancef    Description of the procedure: Patient was taken to the to the operating room where spinal anesthesia was administered.  The patient was then placed in the dorsal supine position with a left lateral tilt.  The patient was prepped and draped in the normal sterile fashion and a Queen catheter was sterilely placed.  Timeout was completed.  Skin was checked for adequate anesthesia.    A Pfannenstiel skin incision was made with the knife and carried down to the level of the fascia.  The fascial incision was extended bilaterally with the Fitzgerald scissors.  The fascia was then grasped superiorly and inferiorly and both bluntly and sharply dissected away from the underlying rectus muscles.  The rectus muscles were  in the midline and the peritoneum was tented up and entered bluntly.  Peritoneal incision was extended superiorly and inferiorly with good visualization of the bladder.  The bladder blade was placed and the bladder flap was created.  The bladder blade was placed behind the bladder flap.  A low transverse uterine incision was made and extended with the bandage scissors.  The fetal head was gently delivered into the incision.  The fetal head would not fully deliver so the vacuum was called for.  Cup was placed gently  over the baby's head and pressure increased to the green zone.  With 1 gentle pull the head delivered and the vacuum was released.  The baby shoulders and body were gently delivered.  The nares and mouth were bulb suctioned.  After 30 seconds of cord pulsations the cord was clamped and cut.  Baby was taken to the warmer for evaluation.  Cord blood was collected.  The placenta was delivered and noted to be complete.    Uterus was then cleared of all clots.  The corners and lower edge of the uterine incision were grasped with the Allis clamps.  The uterus was closed in 2 layers of 0 Vicryl.  The first layer was a running locking layer.  The second layer was an imbricating layer.  There were some areas of oozing that were made hemostatic with 3 interrupted figure-of-eight sutures of 0 Vicryl.  Excellent hemostasis was noted.  Both ovaries were inspected and noted to be normal.  The uterine incision was reexamined and no bleeding was seen.  Peritoneum was then closed with 3-0 Vicryl.  The rectus muscles were inspected and small areas of bleeding were bovied.  The fascia was closed with 0 Vicryl.  Subcutaneous tissues were irrigated and small areas of bleeding were bovied.  Incisional cocktail was used.  Subcutaneous tissues were closed with 3-0 Vicryl.  The skin was closed with 4-0 Monocryl.  Dermabond was placed over the incision.  All lap and instrument counts were correct.    Surgical assistant was responsible for performing the following activities: Retraction, suction, irrigation, and aiding with delivery of the baby. This skilled assistance was necessary for the success of this case.    Dennis Mckeon M.D.

## 2024-08-29 NOTE — OUTREACH NOTE
[facility]  IP Postpartum    Questions/Answers      Flowsheet Row Responses   Best Method for Contacting Cell   Support Person Present Yes   Does the patient have a car seat at the hospital Yes   Is there a need for additional support/resources? No   Is additional support needed? No   Any questions or concerns? No   Is the patient going to use Meds to Beds? Yes   Any concerns related discharge meds/ability to  prescriptions? No   Confirm Postpartum OB appointment --  [knows to schedule]   Confirm initial well-child Pediatrician appointment date/time: --  [knows to schedule]   Additional post-discharge F/U appointments No   Does patient have transportation to appointments? Yes   Any other assistance needed to ensure she is able to attend appointments? No   Does patient have supplies needed at home for  care? Clothing, Crib, Diapers, Breast Pump          Pt doing well today, in recovery room, post c/s.  She has all baby supplies.  WIC to see her on MB.  Maternal warning s/s reviewed, pt verbalized understanding.  Encouraged to reach out with any needs.  Will follow up again PP.  PP packet sent.    Олег Ndiaye RN  Maternity Nurse Navigator    2024, 12:19 EDT

## 2024-08-29 NOTE — LACTATION NOTE
This note was copied from a baby's chart.  P2,37.2 weeks, [facility] baby-new admission. Mom was not successful BF her 1-st child. Informed PT that LC is available to help with BF until 2100. Offer assistance, but mom declined, said she will call with next feeding if needing help. Reports baby BF well in L&D. Encouraged her to try BF baby every 2-3 h. or PRN. Educated on the importance of stimulation for adequate milk supply. Instructions on how to wake up infant for feeding also given. Mom already ordered PBP. She denies any questions. Encouraged to call if needing help.

## 2024-08-29 NOTE — ANESTHESIA PREPROCEDURE EVALUATION
Anesthesia Evaluation     NPO Solid Status: > 8 hours             Airway   Mallampati: II  TM distance: >3 FB  Neck ROM: full  no difficulty expected  Dental - normal exam     Pulmonary - normal exam   (+) asthma,  Cardiovascular - normal exam        Neuro/Psych  GI/Hepatic/Renal/Endo    (+) morbid obesity    Musculoskeletal     Abdominal    Substance History      OB/GYN    (+) Pregnant        Other        ROS/Med Hx Other: Hx drug use              Anesthesia Plan    ASA 3     spinal     (37w2d    )    Anesthetic plan, risks, benefits, and alternatives have been provided, discussed and informed consent has been obtained with: patient.    CODE STATUS:    Level Of Support Discussed With: Patient  Code Status (Patient has no pulse and is not breathing): CPR (Attempt to Resuscitate)  Medical Interventions (Patient has pulse or is breathing): Full Support

## 2024-08-29 NOTE — TELEPHONE ENCOUNTER
Pt delivered C- Section 8/29  Pt is called to scheduled 2 week pp  Dr. Mckeon doesn't have any availability. Can pt be worked in or can pt be seen by NP/PA. Please advise

## 2024-08-29 NOTE — ANESTHESIA PROCEDURE NOTES
Spinal Block    Pre-sedation assessment completed: 8/29/2024 7:40 AM    Patient location during procedure: OR  Start Time: 8/29/2024 7:40 AM  Stop Time: 8/29/2024 7:44 AM  Indication:at surgeon's request  Performed By  Anesthesiologist: Eric Middleton MD  Preanesthetic Checklist  Completed: patient identified, IV checked, site marked, risks and benefits discussed, surgical consent, monitors and equipment checked, pre-op evaluation and timeout performed  Spinal Block Prep:  Patient Position:sitting  Sterile Tech:cap, gloves, gown, mask and sterile barriers  Prep:Chloraprep  Patient Monitoring:blood pressure monitoring, continuous pulse oximetry and EKG    Spinal Block Procedure  Approach:midline  Guidance:landmark technique and palpation technique  Location:L4-L5  Needle Type:Sprotte  Needle Gauge:24  Placement of Spinal needle event:cerebrospinal fluid aspirated  Paresthesia: no  Fluid Appearance:clear  Medications: fentaNYL citrate (PF) (SUBLIMAZE) injection - Intrathecal   20 mcg - 8/29/2024 7:40:00 AM  Morphine sulfate (PF) injection - Spinal   100 mcg - 8/29/2024 7:40:00 AM  bupivacaine PF (MARCAINE) 0.75 % injection - Spinal   1.6 mL - 8/29/2024 7:40:00 AM   Post Assessment  Patient Tolerance:patient tolerated the procedure well with no apparent complications  Complications no

## 2024-08-29 NOTE — PLAN OF CARE
Problem: Adult Inpatient Plan of Care  Goal: Plan of Care Review  Outcome: Ongoing, Progressing  Flowsheets (Taken 2024 1100)  Plan of Care Reviewed With: patient  Outcome Evaluation: Patient post op for  section. minimal post-operative bleeding or pain. Patient received Tordol for pain and Benadryl for itching. Assisted with breastfeeding. VSS. ERAS protocol completed. Plans to transfer to Mother baby  Goal: Patient-Specific Goal (Individualized)  Outcome: Ongoing, Progressing  Goal: Absence of Hospital-Acquired Illness or Injury  Outcome: Ongoing, Progressing  Intervention: Identify and Manage Fall Risk  Recent Flowsheet Documentation  Taken 2024 1000 by Staci Dean RN  Safety Promotion/Fall Prevention:   safety round/check completed   nonskid shoes/slippers when out of bed  Taken 2024 0625 by Staci Dean RN  Safety Promotion/Fall Prevention:   safety round/check completed   nonskid shoes/slippers when out of bed  Intervention: Prevent and Manage VTE (Venous Thromboembolism) Risk  Recent Flowsheet Documentation  Taken 2024 1100 by Staci Dean RN  Activity Management: bedrest  VTE Prevention/Management:   sequential compression devices on   bilateral  Taken 2024 1045 by Staci Dena RN  Activity Management: bedrest  Taken 2024 1030 by Staci Dean RN  Activity Management: bedrest  VTE Prevention/Management:   sequential compression devices on   bilateral  Taken 2024 1015 by Staci Dean RN  Activity Management: bedrest  VTE Prevention/Management:   sequential compression devices on   bilateral  Taken 2024 1000 by Staci Dean RN  Activity Management: bedrest  VTE Prevention/Management:   sequential compression devices on   bilateral  Taken 2024 0945 by Staci Dean RN  Activity Management: bedrest  Taken 2024 0935 by Staci Dean RN  Activity Management: bedrest  Taken 2024 0917 by Dianne  FERN Small  Activity Management: bedrest  Taken 2024 0904 by Staci Dean RN  Activity Management: bedrest  VTE Prevention/Management:   bilateral   sequential compression devices on  Taken 2024 0625 by Staci Dean RN  Activity Management: up ad any  Goal: Optimal Comfort and Wellbeing  Outcome: Ongoing, Progressing  Intervention: Provide Person-Centered Care  Recent Flowsheet Documentation  Taken 2024 1000 by Staci Dean RN  Trust Relationship/Rapport:   care explained   choices provided   thoughts/feelings acknowledged   questions encouraged   questions answered  Taken 2024 0625 by Staci Dean RN  Trust Relationship/Rapport:   care explained   choices provided   thoughts/feelings acknowledged   reassurance provided   questions encouraged   questions answered   empathic listening provided   emotional support provided  Goal: Readiness for Transition of Care  Outcome: Ongoing, Progressing     Problem: Suicide Risk  Goal: Absence of Self-Harm  Outcome: Ongoing, Progressing  Intervention: Promote Psychosocial Wellbeing  Recent Flowsheet Documentation  Taken 2024 1000 by Staci Dean RN  Family/Support System Care: involvement promoted  Taken 2024 0625 by Staci Dean RN  Family/Support System Care:   involvement promoted   presence promoted     Problem: Bleeding ( Delivery)  Goal: Bleeding is Controlled  Outcome: Ongoing, Progressing     Problem: Change in Fetal Wellbeing ( Delivery)  Goal: Stable Fetal Wellbeing  Outcome: Ongoing, Progressing     Problem: Infection ( Delivery)  Goal: Absence of Infection Signs and Symptoms  Outcome: Ongoing, Progressing     Problem: Respiratory Compromise ( Delivery)  Goal: Effective Oxygenation and Ventilation  Outcome: Ongoing, Progressing     Problem: Device-Related Complication Risk (Anesthesia/Analgesia, Neuraxial)  Goal: Safe Infusion Delivery Completion  Outcome: Ongoing,  Progressing     Problem: Infection (Anesthesia/Analgesia, Neuraxial)  Goal: Absence of Infection Signs and Symptoms  Outcome: Ongoing, Progressing     Problem: Nausea and Vomiting (Anesthesia/Analgesia, Neuraxial)  Goal: Nausea and Vomiting Relief  Outcome: Ongoing, Progressing     Problem: Pain (Anesthesia/Analgesia, Neuraxial)  Goal: Effective Pain Control  Outcome: Ongoing, Progressing  Intervention: Prevent or Manage Pain  Recent Flowsheet Documentation  Taken 2024 0625 by Staci Dean RN  Diversional Activities:   smartphone   television     Problem: Respiratory Compromise (Anesthesia/Analgesia, Neuraxial)  Goal: Effective Oxygenation and Ventilation  Outcome: Ongoing, Progressing  Intervention: Optimize Oxygenation and Ventilation  Recent Flowsheet Documentation  Taken 2024 1000 by Staci Dean RN  Head of Bed (HOB) Positioning: HOB at 45 degrees     Problem: Sensorimotor Impairment (Anesthesia/Analgesia, Neuraxial)  Goal: Baseline Motor Function  Outcome: Ongoing, Progressing  Intervention: Optimize Sensorimotor Function  Recent Flowsheet Documentation  Taken 2024 1000 by Staci Dean RN  Safety Promotion/Fall Prevention:   safety round/check completed   nonskid shoes/slippers when out of bed  Taken 2024 0625 by Staci Dean RN  Safety Promotion/Fall Prevention:   safety round/check completed   nonskid shoes/slippers when out of bed     Problem: Urinary Retention (Anesthesia/Analgesia, Neuraxial)  Goal: Effective Urinary Elimination  Outcome: Ongoing, Progressing     Problem:  Fall Injury Risk  Goal: Absence of Fall, Infant Drop and Related Injury  Outcome: Ongoing, Progressing  Intervention: Promote Injury-Free Environment  Recent Flowsheet Documentation  Taken 2024 1000 by Staci Dean RN  Safety Promotion/Fall Prevention:   safety round/check completed   nonskid shoes/slippers when out of bed  Taken 2024 0625 by Staci Dean  RN  Safety Promotion/Fall Prevention:   safety round/check completed   nonskid shoes/slippers when out of bed     Problem: Pain Acute  Goal: Acceptable Pain Control and Functional Ability  Outcome: Ongoing, Progressing  Intervention: Optimize Psychosocial Wellbeing  Recent Flowsheet Documentation  Taken 2024 0625 by Staci Dean RN  Diversional Activities:   smartphone   television     Problem: Skin Injury Risk Increased  Goal: Skin Health and Integrity  Outcome: Ongoing, Progressing  Intervention: Optimize Skin Protection  Recent Flowsheet Documentation  Taken 2024 1000 by Staci Dean RN  Head of Bed (HOB) Positioning: HOB at 45 degrees     Problem: Adjustment to Role Transition (Postpartum  Delivery)  Goal: Successful Maternal Role Transition  Outcome: Ongoing, Progressing     Problem: Bleeding (Postpartum  Delivery)  Goal: Hemostasis  Outcome: Ongoing, Progressing     Problem: Infection (Postpartum  Delivery)  Goal: Absence of Infection Signs and Symptoms  Outcome: Ongoing, Progressing     Problem: Pain (Postpartum  Delivery)  Goal: Acceptable Pain Control  Outcome: Ongoing, Progressing     Problem: Postoperative Nausea and Vomiting (Postpartum  Delivery)  Goal: Nausea and Vomiting Relief  Outcome: Ongoing, Progressing     Problem: Postoperative Urinary Retention (Postpartum  Delivery)  Goal: Effective Urinary Elimination  Outcome: Ongoing, Progressing   Goal Outcome Evaluation:  Plan of Care Reviewed With: patient           Outcome Evaluation: Patient post op for  section. minimal post-operative bleeding or pain. Patient received Tordol for pain and Benadryl for itching. Assisted with breastfeeding. VSS. ERAS protocol completed. Plans to transfer to Mother baby

## 2024-08-29 NOTE — ANESTHESIA POSTPROCEDURE EVALUATION
Patient: Lake Sprague    Procedure Summary       Date: 24 Room / Location:  [facility]   [facility]    Anesthesia Start: 731 Anesthesia Stop: 905    Procedure:  SECTION REPEAT (Abdomen) Diagnosis:       Previous  section      History of IUFD      (Previous  section [Z98.891])      (History of IUFD [Z87.59])    Surgeons: Dennis Mckeon MD Provider: Eric Middleton MD    Anesthesia Type: spinal ASA Status: 3            Anesthesia Type: spinal    Vitals  Vitals Value Taken Time   BP 99/53 24 1016   Temp 36.4 °C (97.5 °F) 24 0904   Pulse 69 24 1025   Resp 16 24 1000   SpO2 99 % 24 1025   Vitals shown include unfiled device data.        Post Anesthesia Care and Evaluation    Patient location during evaluation: bedside  Patient participation: complete - patient participated  Level of consciousness: awake  Pain management: adequate    Airway patency: patent  Anesthetic complications: No anesthetic complications  PONV Status: controlled  Cardiovascular status: acceptable  Respiratory status: acceptable  Hydration status: acceptable    Comments: --------------------            24               1020     --------------------   BP:                  Pulse:      84       Resp:                Temp:                SpO2:      99%      --------------------

## 2024-08-30 LAB
BASOPHILS # BLD AUTO: 0.04 10*3/MM3 (ref 0–0.2)
BASOPHILS NFR BLD AUTO: 0.3 % (ref 0–1.5)
DEPRECATED RDW RBC AUTO: 42.9 FL (ref 37–54)
EOSINOPHIL # BLD AUTO: 0.32 10*3/MM3 (ref 0–0.4)
EOSINOPHIL NFR BLD AUTO: 2.7 % (ref 0.3–6.2)
ERYTHROCYTE [DISTWIDTH] IN BLOOD BY AUTOMATED COUNT: 13.7 % (ref 12.3–15.4)
HCT VFR BLD AUTO: 34.5 % (ref 34–46.6)
HGB BLD-MCNC: 11 G/DL (ref 12–15.9)
IMM GRANULOCYTES # BLD AUTO: 0.09 10*3/MM3 (ref 0–0.05)
IMM GRANULOCYTES NFR BLD AUTO: 0.8 % (ref 0–0.5)
LYMPHOCYTES # BLD AUTO: 1.81 10*3/MM3 (ref 0.7–3.1)
LYMPHOCYTES NFR BLD AUTO: 15.5 % (ref 19.6–45.3)
MCH RBC QN AUTO: 27.4 PG (ref 26.6–33)
MCHC RBC AUTO-ENTMCNC: 31.9 G/DL (ref 31.5–35.7)
MCV RBC AUTO: 85.8 FL (ref 79–97)
MONOCYTES # BLD AUTO: 0.57 10*3/MM3 (ref 0.1–0.9)
MONOCYTES NFR BLD AUTO: 4.9 % (ref 5–12)
NEUTROPHILS NFR BLD AUTO: 75.8 % (ref 42.7–76)
NEUTROPHILS NFR BLD AUTO: 8.87 10*3/MM3 (ref 1.7–7)
NRBC BLD AUTO-RTO: 0 /100 WBC (ref 0–0.2)
PLATELET # BLD AUTO: 313 10*3/MM3 (ref 140–450)
PMV BLD AUTO: 10.6 FL (ref 6–12)
RBC # BLD AUTO: 4.02 10*6/MM3 (ref 3.77–5.28)
WBC NRBC COR # BLD AUTO: 11.7 10*3/MM3 (ref 3.4–10.8)

## 2024-08-30 PROCEDURE — 25010000002 KETOROLAC TROMETHAMINE PER 15 MG: Performed by: OBSTETRICS & GYNECOLOGY

## 2024-08-30 PROCEDURE — 99231 SBSQ HOSP IP/OBS SF/LOW 25: CPT | Performed by: OBSTETRICS & GYNECOLOGY

## 2024-08-30 PROCEDURE — 94799 UNLISTED PULMONARY SVC/PX: CPT

## 2024-08-30 PROCEDURE — 94664 DEMO&/EVAL PT USE INHALER: CPT

## 2024-08-30 PROCEDURE — 85025 COMPLETE CBC W/AUTO DIFF WBC: CPT | Performed by: OBSTETRICS & GYNECOLOGY

## 2024-08-30 PROCEDURE — 25010000002 ENOXAPARIN PER 10 MG: Performed by: OBSTETRICS & GYNECOLOGY

## 2024-08-30 RX ADMIN — HYDROXYZINE HYDROCHLORIDE 50 MG: 50 TABLET ORAL at 00:11

## 2024-08-30 RX ADMIN — ACETAMINOPHEN 325MG 650 MG: 325 TABLET ORAL at 22:42

## 2024-08-30 RX ADMIN — SIMETHICONE 80 MG: 80 TABLET, CHEWABLE ORAL at 18:38

## 2024-08-30 RX ADMIN — BUDESONIDE 0.5 MG: 0.5 INHALANT ORAL at 09:33

## 2024-08-30 RX ADMIN — SIMETHICONE 80 MG: 80 TABLET, CHEWABLE ORAL at 12:29

## 2024-08-30 RX ADMIN — ENOXAPARIN SODIUM 40 MG: 100 INJECTION SUBCUTANEOUS at 08:13

## 2024-08-30 RX ADMIN — ENOXAPARIN SODIUM 40 MG: 100 INJECTION SUBCUTANEOUS at 20:26

## 2024-08-30 RX ADMIN — IBUPROFEN 600 MG: 600 TABLET, FILM COATED ORAL at 23:54

## 2024-08-30 RX ADMIN — KETOROLAC TROMETHAMINE 15 MG: 15 INJECTION, SOLUTION INTRAMUSCULAR; INTRAVENOUS at 05:52

## 2024-08-30 RX ADMIN — OXYCODONE HYDROCHLORIDE 5 MG: 5 TABLET ORAL at 23:54

## 2024-08-30 RX ADMIN — DOCUSATE SODIUM 100 MG: 100 CAPSULE, LIQUID FILLED ORAL at 20:26

## 2024-08-30 RX ADMIN — SIMETHICONE 80 MG: 80 TABLET, CHEWABLE ORAL at 08:13

## 2024-08-30 RX ADMIN — ACETAMINOPHEN 325MG 650 MG: 325 TABLET ORAL at 15:21

## 2024-08-30 RX ADMIN — OXYCODONE HYDROCHLORIDE 5 MG: 5 TABLET ORAL at 14:26

## 2024-08-30 RX ADMIN — DOCUSATE SODIUM 100 MG: 100 CAPSULE, LIQUID FILLED ORAL at 08:13

## 2024-08-30 RX ADMIN — SIMETHICONE 80 MG: 80 TABLET, CHEWABLE ORAL at 20:26

## 2024-08-30 RX ADMIN — IBUPROFEN 600 MG: 600 TABLET, FILM COATED ORAL at 18:38

## 2024-08-30 RX ADMIN — KETOROLAC TROMETHAMINE 15 MG: 15 INJECTION, SOLUTION INTRAMUSCULAR; INTRAVENOUS at 12:29

## 2024-08-30 RX ADMIN — OXYCODONE HYDROCHLORIDE 10 MG: 10 TABLET ORAL at 18:38

## 2024-08-30 RX ADMIN — SERTRALINE 50 MG: 50 TABLET, FILM COATED ORAL at 22:42

## 2024-08-30 RX ADMIN — ACETAMINOPHEN 1000 MG: 500 TABLET ORAL at 03:46

## 2024-08-30 RX ADMIN — ACETAMINOPHEN 1000 MG: 500 TABLET ORAL at 09:22

## 2024-08-30 NOTE — PROGRESS NOTES
[facility]   Obstetrics and Gynecology     2024    Patient: Lake Sprague   MR#:[id number]        Progress note         HD#1  Post-Op Day 1 S/P    Delivered a male infant.    Subjective     Lake Sprague is a 33 y.o. female  post operative from CS at 37w3d weeks  Patient reports:  Pain is well controlled. Voiding and ambulating without difficulty.  Tolerating po. Lochia normal.     Breast/bottle The patient is currently breastfeeding.    Patient Active Problem List   Diagnosis    Asthma    Anxiety and depression    Bipolar 2 disorder    Posttraumatic stress disorder    Previous  section    History of IUFD    Marijuana use during pregnancy    Plantar fasciitis    Body mass index (BMI) 40.0-44.9, adult    Subchorionic hemorrhage of placenta, antepartum    Obesity in pregnancy, antepartum    Maternal anemia in pregnancy, antepartum    GBS (group B Streptococcus carrier), +RV culture, currently pregnant        Objective      Vital Signs Range for the last 24 hours    Temperature: Temp:  [97.4 °F (36.3 °C)-98 °F (36.7 °C)] 98 °F (36.7 °C)  BP:  BP: ()/(39-68) 104/68  Pulse:  Heart Rate:  [61-84] 80  Respirations: Resp:  [16-20] 20  Weight: 126 kg (277 lb 12.5 oz)   BMI:  Body mass index is 49.22 kg/m².    I/O last 3 completed shifts:  In: 2649 [I.V.:2649]  Out: 2870 [Urine:2325; Blood:545]  I/O this shift:  In: -   Out: 600 [Urine:600]     Physical Exam  Vitals and nursing note reviewed.   Constitutional:       Appearance: Normal appearance. She is obese.   Pulmonary:      Effort: Pulmonary effort is normal.   Neurological:      Mental Status: She is alert and oriented to person, place, and time.   Psychiatric:         Mood and Affect: Mood normal.         Behavior: Behavior normal.         LABS:    Results from last 7 days   Lab Units 24  0555   WBC 10*3/mm3 11.59*   HEMOGLOBIN g/dL 11.1*   HEMATOCRIT % 33.9*   PLATELETS 10*3/mm3 324              Assessment & Plan     1.  POD #1 S/P C/S:  Hemodynamically stable.  Doing well.     History of IUFD    Asthma    Anxiety and depression    Bipolar 2 disorder    Posttraumatic stress disorder    Previous  section    Marijuana use during pregnancy    Body mass index (BMI) 40.0-44.9, adult    Obesity in pregnancy, antepartum    Maternal anemia in pregnancy, antepartum    GBS (group B Streptococcus carrier), +RV culture, currently pregnant      Plan:    Continue routine postoperative care   Infant circumcision:  Procedure reviewed with the patient including risk, benefits and elective nature of the procedure  Continue Lovenox for DVT prophylaxis          Kenzie Pulido MD  2024  10:13 EDT

## 2024-08-30 NOTE — PROGRESS NOTES
"Discharge Planning Assessment  [facility]     Patient Name: Lake Sprague  MRN: [id number]  Today's Date: 8/30/2024    Admit Date: 8/29/2024    Plan: Infant may discharge to mother when medically ready; CSW will follow cord. ELOY Laguna.   Discharge Needs Assessment    No documentation.                  Discharge Plan       Row Name 08/30/24 1256       Plan    Plan Infant may discharge to mother when medically ready; CSW will follow cord. ELOY Laguna.    Plan Comments Mother: Lake Sprague \"Lake\", MRN: [id number]; infant: Bernarda \"Omid\" Celestine, MRN: [id number]. CSW consulted for \"positive THC on admission.\" Of note, mother's UDS was positive for THC prenatally on 2/12 & on admit. Infant's UDS was missed; cord toxicology sent. CSW met with mother alone at bedside. Mother verified address, phone number, and insurance. Mother reports [employer] has spoken to her about adding infant to health insurance. Mother reports having a car seat, crib/bassinet, clothes, and diapers for infant. Mother has one other child from a previous relationship: 11yr old, who is being cared for by child's father during hospital stay. Mother reports, maternal grandma, paternal grandma, father of infant/SO, and other family members are available for support as needed. Mother reports infant is following up with the Dr. Hurst after discharge; mother is comfortable scheduling appointments for infant and has reliable transportation. Mother is not current with [facility] but voiced interest in applying. CSW consulted the hospital WIC rep. Mother denies any violence, threats, or feeling unsafe at home or relationship. CSW inquired about mother's THC use. Mother reports she did not use THC during pregnancy. Mother reports FOB smokes and she was around him. Mother reports she told FOB he cannot smoke in the house once infant comes home. CSW discussed infant's cord toxicology, as well as mandated reporting to CPS if infant's cord toxicology is " positive for THC or other substances. Mother voiced understanding. Mother is a part of the Motherhood [facility] program. CSW provided mother with a packet of resources including: WIC, HANDS, transportation, infant supplies, counseling, online support groups, postpartum mood and anxiety resources, and general community resources. CSW spent time building rapport with mother, and offered validation, support, and encouragement to mother throughout assessment. Mother was polite and appropriate, and denied having unmet needs or concerns at this time. CSW will follow cord toxicology and complete mandated reporting to CPS if warranted. ELOY Laguna.                  Continued Care and Services - Admitted Since 8/29/2024    No active coordination exists for this encounter.       Selected Continued Care - Episodes Includes continued care and service providers with selected services from the active episodes listed below      Mother[facility] Episode start date: 5/9/2024   There are no active outsourced providers for this episode.                    Demographic Summary       Row Name 08/30/24 1254       General Information    Admission Type inpatient    Arrived From home    Referral Source nursing    Reason for Consult substance use concerns    General Information Comments Positive THC on admission                   Functional Status       Row Name 08/30/24 1254       Functional Status, IADL    Medications independent    Meal Preparation independent    Housekeeping independent    Laundry independent    Shopping independent       Mental Status    General Appearance WDL WDL       Mental Status Summary    Recent Changes in Mental Status/Cognitive Functioning no changes       Employment/    Employment Status employed full-time    Employment/ Comments [employer]                   Psychosocial       Row Name 08/30/24 1255       Behavior WDL    Behavior WDL WDL       Emotion Mood WDL    Emotion/Mood/Affect WDL  WDL       Speech WDL    Speech WDL WDL       Perceptual State WDL    Perceptual State WDL WDL       Thought Process WDL    Thought Process WDL WDL       Intellectual Performance WDL    Intellectual Performance WDL WDL       Coping/Stress    Major Change/Loss/Stressor birth    Patient Personal Strengths future/goal oriented;motivated;positive attitude;strong support system    Sources of Support other family members;parent(s);significant other                   Abuse/Neglect       Row Name 08/30/24 1255       Personal Safety    Feels Unsafe at Home or Work/School no    Feels Threatened by Someone no    Does Anyone Try to Keep You From Having Contact with Others or Doing Things Outside Your Home? no    Physical Signs of Abuse Present no                   Legal    No documentation.                  Substance Abuse       Row Name 08/30/24 1255       Substance Use    Substance Use Comment Mom UDS pos THC, no infant UDS; cord tox sent.                   Patient Forms    No documentation.                     ERENDIRA Anna

## 2024-08-30 NOTE — LACTATION NOTE
This note was copied from a baby's chart.  Baby is currently in nursery and Mom is resting.  Reports that baby is latching some for short feedings.  Has been sleepy and concerned baby is not getting enough and is giving formula supplement.  Has a personal pump at bedside.  Encouraged to pump if baby is not latching well to help establish adequate milk supply.  Encouraged to call LC when ready to pump to go over pump use and settings.  Number is on whiteboard.

## 2024-08-30 NOTE — PLAN OF CARE
Goal Outcome Evaluation:            Progressing well, has ambulated several times, breastfeeding, pain controlled, minimal bleeding

## 2024-08-31 PROCEDURE — 94664 DEMO&/EVAL PT USE INHALER: CPT

## 2024-08-31 PROCEDURE — 94799 UNLISTED PULMONARY SVC/PX: CPT

## 2024-08-31 PROCEDURE — 25010000002 ENOXAPARIN PER 10 MG: Performed by: OBSTETRICS & GYNECOLOGY

## 2024-08-31 RX ADMIN — ACETAMINOPHEN 325MG 650 MG: 325 TABLET ORAL at 05:29

## 2024-08-31 RX ADMIN — SIMETHICONE 80 MG: 80 TABLET, CHEWABLE ORAL at 20:18

## 2024-08-31 RX ADMIN — OXYCODONE HYDROCHLORIDE 10 MG: 10 TABLET ORAL at 18:30

## 2024-08-31 RX ADMIN — IBUPROFEN 600 MG: 600 TABLET, FILM COATED ORAL at 09:35

## 2024-08-31 RX ADMIN — OXYCODONE HYDROCHLORIDE 10 MG: 10 TABLET ORAL at 09:35

## 2024-08-31 RX ADMIN — BUDESONIDE 0.5 MG: 0.5 INHALANT ORAL at 07:50

## 2024-08-31 RX ADMIN — DOCUSATE SODIUM 100 MG: 100 CAPSULE, LIQUID FILLED ORAL at 20:18

## 2024-08-31 RX ADMIN — ACETAMINOPHEN 325MG 650 MG: 325 TABLET ORAL at 14:32

## 2024-08-31 RX ADMIN — SIMETHICONE 80 MG: 80 TABLET, CHEWABLE ORAL at 09:35

## 2024-08-31 RX ADMIN — ACETAMINOPHEN 325MG 650 MG: 325 TABLET ORAL at 20:18

## 2024-08-31 RX ADMIN — OXYCODONE HYDROCHLORIDE 10 MG: 10 TABLET ORAL at 14:32

## 2024-08-31 RX ADMIN — ENOXAPARIN SODIUM 40 MG: 100 INJECTION SUBCUTANEOUS at 20:18

## 2024-08-31 RX ADMIN — IBUPROFEN 600 MG: 600 TABLET, FILM COATED ORAL at 17:22

## 2024-08-31 RX ADMIN — ENOXAPARIN SODIUM 40 MG: 100 INJECTION SUBCUTANEOUS at 09:35

## 2024-08-31 RX ADMIN — SERTRALINE 50 MG: 50 TABLET, FILM COATED ORAL at 10:15

## 2024-08-31 RX ADMIN — BUDESONIDE 0.5 MG: 0.5 INHALANT ORAL at 19:30

## 2024-08-31 RX ADMIN — DOCUSATE SODIUM 100 MG: 100 CAPSULE, LIQUID FILLED ORAL at 09:35

## 2024-08-31 RX ADMIN — SIMETHICONE 80 MG: 80 TABLET, CHEWABLE ORAL at 17:22

## 2024-08-31 NOTE — PLAN OF CARE
Problem: Adult Inpatient Plan of Care  Goal: Plan of Care Review  Outcome: Ongoing, Progressing  Flowsheets  Taken 2024 by Matilde Maria RN  Progress: improving  Outcome Evaluation: VSS, assessment WNL, pain well controlled with motrin/tylenol/oxycodone. Ambulating and voiding wo difficulty, breastfeeding infant  Taken 2024 1100 by Staci Dean RN  Plan of Care Reviewed With: patient  Goal: Patient-Specific Goal (Individualized)  Outcome: Ongoing, Progressing  Goal: Absence of Hospital-Acquired Illness or Injury  Outcome: Ongoing, Progressing  Intervention: Identify and Manage Fall Risk  Recent Flowsheet Documentation  Taken 2024 by Matilde Maria RN  Safety Promotion/Fall Prevention: safety round/check completed  Intervention: Prevent Skin Injury  Recent Flowsheet Documentation  Taken 2024 by Matilde Maria RN  Body Position: position changed independently  Intervention: Prevent and Manage VTE (Venous Thromboembolism) Risk  Recent Flowsheet Documentation  Taken 2024 by Matilde Maria RN  Activity Management: up ad any  Goal: Optimal Comfort and Wellbeing  Outcome: Ongoing, Progressing  Intervention: Provide Person-Centered Care  Recent Flowsheet Documentation  Taken 2024 by Matilde Maria RN  Trust Relationship/Rapport:   care explained   choices provided   questions answered   questions encouraged  Goal: Readiness for Transition of Care  Outcome: Ongoing, Progressing     Problem: Suicide Risk  Goal: Absence of Self-Harm  Outcome: Ongoing, Progressing  Intervention: Promote Psychosocial Wellbeing  Recent Flowsheet Documentation  Taken 2024 by Matilde Maria RN  Family/Support System Care: self-care encouraged     Problem: Bleeding ( Delivery)  Goal: Bleeding is Controlled  Outcome: Ongoing, Progressing     Problem: Change in Fetal Wellbeing ( Delivery)  Goal: Stable Fetal Wellbeing  Outcome: Ongoing,  Progressing  Intervention: Promote and Monitor Fetal Wellbeing  Recent Flowsheet Documentation  Taken 2024 by Matilde Maria RN  Body Position: position changed independently     Problem: Infection ( Delivery)  Goal: Absence of Infection Signs and Symptoms  Outcome: Ongoing, Progressing     Problem: Respiratory Compromise ( Delivery)  Goal: Effective Oxygenation and Ventilation  Outcome: Ongoing, Progressing     Problem: Device-Related Complication Risk (Anesthesia/Analgesia, Neuraxial)  Goal: Safe Infusion Delivery Completion  Outcome: Ongoing, Progressing     Problem: Infection (Anesthesia/Analgesia, Neuraxial)  Goal: Absence of Infection Signs and Symptoms  Outcome: Ongoing, Progressing     Problem: Nausea and Vomiting (Anesthesia/Analgesia, Neuraxial)  Goal: Nausea and Vomiting Relief  Outcome: Ongoing, Progressing     Problem: Pain (Anesthesia/Analgesia, Neuraxial)  Goal: Effective Pain Control  Outcome: Ongoing, Progressing  Intervention: Prevent or Manage Pain  Recent Flowsheet Documentation  Taken 2024 by Matilde Maria RN  Diversional Activities: television     Problem: Respiratory Compromise (Anesthesia/Analgesia, Neuraxial)  Goal: Effective Oxygenation and Ventilation  Outcome: Ongoing, Progressing  Intervention: Optimize Oxygenation and Ventilation  Recent Flowsheet Documentation  Taken 2024 by Matilde Maria RN  Head of Bed (HOB) Positioning: HOB elevated     Problem: Sensorimotor Impairment (Anesthesia/Analgesia, Neuraxial)  Goal: Baseline Motor Function  Outcome: Ongoing, Progressing  Intervention: Optimize Sensorimotor Function  Recent Flowsheet Documentation  Taken 2024 by Matilde Maria, RN  Safety Promotion/Fall Prevention: safety round/check completed     Problem: Urinary Retention (Anesthesia/Analgesia, Neuraxial)  Goal: Effective Urinary Elimination  Outcome: Ongoing, Progressing     Problem:  Fall Injury  Risk  Goal: Absence of Fall, Infant Drop and Related Injury  Outcome: Ongoing, Progressing  Intervention: Identify and Manage Contributors  Recent Flowsheet Documentation  Taken 2024 by Matilde Maria RN  Medication Review/Management: medications reviewed  Intervention: Promote Injury-Free Environment  Recent Flowsheet Documentation  Taken 2024 by Matilde Maria RN  Safety Promotion/Fall Prevention: safety round/check completed     Problem: Pain Acute  Goal: Acceptable Pain Control and Functional Ability  Outcome: Ongoing, Progressing  Intervention: Prevent or Manage Pain  Recent Flowsheet Documentation  Taken 2024 by Matilde Maria RN  Medication Review/Management: medications reviewed  Intervention: Optimize Psychosocial Wellbeing  Recent Flowsheet Documentation  Taken 2024 by Matilde Maria RN  Diversional Activities: television     Problem: Skin Injury Risk Increased  Goal: Skin Health and Integrity  Outcome: Ongoing, Progressing  Intervention: Optimize Skin Protection  Recent Flowsheet Documentation  Taken 2024 by Matilde Maria RN  Head of Bed (HOB) Positioning: HOB elevated     Problem: Adjustment to Role Transition (Postpartum  Delivery)  Goal: Successful Maternal Role Transition  Outcome: Ongoing, Progressing     Problem: Bleeding (Postpartum  Delivery)  Goal: Hemostasis  Outcome: Ongoing, Progressing     Problem: Infection (Postpartum  Delivery)  Goal: Absence of Infection Signs and Symptoms  Outcome: Ongoing, Progressing     Problem: Pain (Postpartum  Delivery)  Goal: Acceptable Pain Control  Outcome: Ongoing, Progressing     Problem: Postoperative Nausea and Vomiting (Postpartum  Delivery)  Goal: Nausea and Vomiting Relief  Outcome: Ongoing, Progressing     Problem: Postoperative Urinary Retention (Postpartum  Delivery)  Goal: Effective Urinary Elimination  Outcome: Ongoing, Progressing    Goal Outcome Evaluation:           Progress: improving  Outcome Evaluation: VSS, assessment WNL, pain well controlled with motrin/tylenol/oxycodone. Ambulating and voiding wo difficulty, breastfeeding infant

## 2024-08-31 NOTE — PROGRESS NOTES
[facility]   Obstetrics and Gynecology     2024    Name:Lake Sprague    MR#:[id number]     Progress Note:  Post-Op    HD:2    Subjective   33 y.o. yo Female  s/p CS at 37w2d doing well. Pain well controlled. Tolerating regular diet and having flatus. Lochia normal.     Patient Active Problem List   Diagnosis    Asthma    Anxiety and depression    Bipolar 2 disorder    Posttraumatic stress disorder    Previous  section    History of IUFD    Marijuana use during pregnancy    Plantar fasciitis    Body mass index (BMI) 40.0-44.9, adult    Subchorionic hemorrhage of placenta, antepartum    Obesity in pregnancy, antepartum    Maternal anemia in pregnancy, antepartum    GBS (group B Streptococcus carrier), +RV culture, currently pregnant        Objective    Vitals  Temp:  Temp:  [97.7 °F (36.5 °C)-97.9 °F (36.6 °C)] 97.8 °F (36.6 °C)  Temp src: Oral  BP:  BP: (102-136)/(66-85) 136/85  Pulse:  Heart Rate:  [] 88  RR:   Resp:  [16-20] 18  Weight: 126 kg (277 lb 12.5 oz)  BMI:  Body mass index is 49.22 kg/m².    Physical Exam  Vitals and nursing note reviewed.   Constitutional:       General: She is not in acute distress.     Appearance: Normal appearance. She is well-developed. She is not ill-appearing.   HENT:      Head: Normocephalic.   Pulmonary:      Effort: Pulmonary effort is normal.   Abdominal:      General: Abdomen is flat. There is no distension.      Palpations: Abdomen is soft. There is no mass.      Tenderness: There is no abdominal tenderness.   Genitourinary:     Vagina: Normal.      Comments: Lochia is scant  Fundus is firm    Incision:  dry/clean/intact  Musculoskeletal:         General: No swelling or tenderness.   Neurological:      Mental Status: She is alert and oriented to person, place, and time.   Psychiatric:         Behavior: Behavior normal.         Thought Content: Thought content normal.         Judgment: Judgment normal.         I/O  last 3 completed shifts:  In: 1800 [P.O.:1800]  Out: 3900 [Urine:3900]    LABS:  Results from last 7 days   Lab Units 24  0932 24  0555   WBC 10*3/mm3 11.70* 11.59*   HEMOGLOBIN g/dL 11.0* 11.1*   HEMATOCRIT % 34.5 33.9*   PLATELETS 10*3/mm3 313 324           Infant: male       Assessment    1.  POD#2     History of IUFD    Asthma    Anxiety and depression    Bipolar 2 disorder    Posttraumatic stress disorder    Previous  section    Marijuana use during pregnancy    Body mass index (BMI) 40.0-44.9, adult    Obesity in pregnancy, antepartum    Maternal anemia in pregnancy, antepartum    GBS (group B Streptococcus carrier), +RV culture, currently pregnant      Plan:  Continue routine postoperative care   Plan discharge tomorrow    Rafa Hemphill MD  2024 09:22 EDT

## 2024-09-01 VITALS
HEIGHT: 63 IN | HEART RATE: 94 BPM | SYSTOLIC BLOOD PRESSURE: 138 MMHG | TEMPERATURE: 97.8 F | WEIGHT: 277.78 LBS | DIASTOLIC BLOOD PRESSURE: 77 MMHG | RESPIRATION RATE: 18 BRPM | OXYGEN SATURATION: 97 % | BODY MASS INDEX: 49.22 KG/M2

## 2024-09-01 PROCEDURE — 94664 DEMO&/EVAL PT USE INHALER: CPT

## 2024-09-01 PROCEDURE — 99238 HOSP IP/OBS DSCHRG MGMT 30/<: CPT | Performed by: OBSTETRICS & GYNECOLOGY

## 2024-09-01 PROCEDURE — 94799 UNLISTED PULMONARY SVC/PX: CPT

## 2024-09-01 PROCEDURE — 25010000002 ENOXAPARIN PER 10 MG: Performed by: OBSTETRICS & GYNECOLOGY

## 2024-09-01 RX ORDER — IBUPROFEN 800 MG/1
800 TABLET, FILM COATED ORAL EVERY 8 HOURS PRN
Qty: 40 TABLET | Refills: 1 | Status: SHIPPED | OUTPATIENT
Start: 2024-09-01

## 2024-09-01 RX ORDER — DOCUSATE SODIUM 100 MG/1
100 CAPSULE, LIQUID FILLED ORAL 2 TIMES DAILY
Qty: 28 CAPSULE | Refills: 0 | Status: SHIPPED | OUTPATIENT
Start: 2024-09-01 | End: 2024-09-09

## 2024-09-01 RX ORDER — OXYCODONE AND ACETAMINOPHEN 5; 325 MG/1; MG/1
1 TABLET ORAL EVERY 4 HOURS PRN
Qty: 12 TABLET | Refills: 0 | Status: SHIPPED | OUTPATIENT
Start: 2024-09-01 | End: 2024-09-09

## 2024-09-01 RX ADMIN — DOCUSATE SODIUM 100 MG: 100 CAPSULE, LIQUID FILLED ORAL at 09:26

## 2024-09-01 RX ADMIN — BUDESONIDE 0.5 MG: 0.5 INHALANT ORAL at 08:09

## 2024-09-01 RX ADMIN — ENOXAPARIN SODIUM 40 MG: 100 INJECTION SUBCUTANEOUS at 09:26

## 2024-09-01 RX ADMIN — IBUPROFEN 600 MG: 600 TABLET, FILM COATED ORAL at 00:06

## 2024-09-01 RX ADMIN — OXYCODONE HYDROCHLORIDE 10 MG: 10 TABLET ORAL at 09:43

## 2024-09-01 RX ADMIN — SERTRALINE 50 MG: 50 TABLET, FILM COATED ORAL at 09:44

## 2024-09-01 RX ADMIN — ACETAMINOPHEN 325MG 650 MG: 325 TABLET ORAL at 03:17

## 2024-09-01 RX ADMIN — OXYCODONE HYDROCHLORIDE 10 MG: 10 TABLET ORAL at 00:05

## 2024-09-01 RX ADMIN — SIMETHICONE 80 MG: 80 TABLET, CHEWABLE ORAL at 09:26

## 2024-09-01 RX ADMIN — IBUPROFEN 600 MG: 600 TABLET, FILM COATED ORAL at 06:22

## 2024-09-01 RX ADMIN — ACETAMINOPHEN 325MG 650 MG: 325 TABLET ORAL at 09:26

## 2024-09-01 NOTE — LACTATION NOTE
Pt reports baby is BF well. She is also supplementing with formula. Pt denies questions. Educated on milk coming in, baby's expected output and weight gain. Encouraged to call LC as needed. Pt has [facility] info if needing f/u  Lactation Consult Note    Evaluation Completed: 2024 08:23 EDT  Patient Name: Lake Sprague  :  1990  MRN:  [id number]     REFERRAL  INFORMATION:                                         DELIVERY HISTORY:        Skin to skin initiation date/time: 2024  8:55 AM   Skin to skin end date/time:           MATERNAL ASSESSMENT:                               INFANT ASSESSMENT:  Information for the patient's :  SpragueMt floresifeomaAddison [[id number]]   No past medical history on file.                                                                                                   MATERNAL INFANT FEEDING:                                                                       EQUIPMENT TYPE:                                 BREAST PUMPING:          LACTATION REFERRALS:

## 2024-09-01 NOTE — DISCHARGE SUMMARY
section Discharge Summary    Date of Admission: 2024  Date of Discharge:  2024      Patient: Lake Sprague      MR#:[id number]    Surgeon/OB: Dennis Mckeon MD    Discharge Diagnosis:  section at 37w2d, uncomplicated recovery    Procedures:  , Low Transverse     2024    8:06 AM      Anesthesia:  Spinal     Presenting Problem/History of Present Illness  Previous  section [Z98.891]  History of IUFD [Z87.59]     Patient Active Problem List   Diagnosis    Asthma    Anxiety and depression    Bipolar 2 disorder    Posttraumatic stress disorder    Previous  section    History of IUFD    Marijuana use during pregnancy    Plantar fasciitis    Body mass index (BMI) 40.0-44.9, adult    Subchorionic hemorrhage of placenta, antepartum    Obesity in pregnancy, antepartum    Maternal anemia in pregnancy, antepartum    GBS (group B Streptococcus carrier), +RV culture, currently pregnant       Hospital Course  Patient is a 33 y.o. female  at 37w2d status post  section with uneventful postoperative recovery.  Patient was advanced to regular diet on postoperative day#1.  On discharge, ambulating, tolerating a regular diet without any difficulties and her incision is dry, clean and intact.     Infant:   male  fetus 3230 g (7 lb 1.9 oz)  with Apgar scores of 9 , 9  at five minutes.    Condition on Discharge:  Stable    Vital Signs  Temp:  [97.5 °F (36.4 °C)-98.2 °F (36.8 °C)] 97.8 °F (36.6 °C)  Heart Rate:  [81-96] 94  Resp:  [16-18] 18  BP: (131-138)/(71-89) 138/77    Lab Results   Component Value Date    WBC 11.70 (H) 2024    HGB 11.0 (L) 2024    HCT 34.5 2024    MCV 85.8 2024     2024       Discharge Disposition  Home or Self Care    Discharge Medications     Discharge Medications        New Medications        Instructions Start Date   docusate sodium 100 MG capsule  Commonly known as: Colace   100 mg, Oral, 2 Times Daily       ibuprofen 800 MG tablet  Commonly known as: ADVIL,MOTRIN   800 mg, Oral, Every 8 Hours PRN      oxyCODONE-acetaminophen 5-325 MG per tablet  Commonly known as: Percocet   1 tablet, Oral, Every 4 Hours PRN             Continue These Medications        Instructions Start Date   ferrous sulfate 325 (65 FE) MG tablet   325 mg, Oral, Daily With Breakfast      fluticasone 110 MCG/ACT inhaler  Commonly known as: Flovent HFA   1 puff, Inhalation, 2 Times Daily - RT      Prenatal Multi +DHA 27-0.8-228 MG capsule   1 capsule, Oral, Daily      sertraline 50 MG tablet  Commonly known as: Zoloft   50 mg, Oral, Daily      Ventolin  (90 Base) MCG/ACT inhaler  Generic drug: albuterol sulfate HFA   2 puffs, Inhalation, Every 4 Hours PRN               Discharge Diet: Regular    Follow-up Appointments  Future Appointments   Date Time Provider Department Center   9/5/2024 10:00 AM Lay Le LCSW [facility]  [facility]   9/9/2024 10:30 AM Imelda Espinosa PA [facility]  [facility]     Additional Instructions for the Follow-ups that You Need to Schedule       Call MD With Problems / Concerns   As directed      If you have newly increased incisional pain, drainage or bleeding from the incision, skin redness around the incision  If you have heavy bleeding, soaking through a pad per hour or passing large clots  If you have persistent headache, visual changes, upper abdominal pain  If you have persistent nausea, vomiting  If your pain is severe and uncontrollable    Order Comments: If you have newly increased incisional pain, drainage or bleeding from the incision, skin redness around the incision If you have heavy bleeding, soaking through a pad per hour or passing large clots If you have persistent headache, visual changes, upper abdominal pain If you have persistent nausea, vomiting If your pain is severe and uncontrollable         Discharge Follow-up with Specified Provider: follow up with your primary OB provider in two weeks  and in six weeks; 2 Weeks   As directed      To: follow up with your primary OB provider in two weeks and in six weeks   Follow Up: 2 Weeks   Follow Up Details: Call the office or follow up sooner if you are having any problems                Prenatal labs/vax: A+/I/-/- NR    Parul Bowles MD  9/1/2024  10:56 EDT

## 2024-09-01 NOTE — PLAN OF CARE
Goal Outcome Evaluation:              Outcome Evaluation: vss, ready to dc home,

## 2024-09-01 NOTE — PROGRESS NOTES
2024    Name:Lake Sprague    MR#:[id number]     Progress Note:  Post-Op day 3 S/P    HD:3    Subjective   33 y.o. yo Female  s/p CS at 37w2d doing well. Pain well controlled. Tolerating regular diet and having flatus. Lochia normal.     Patient Active Problem List   Diagnosis    Asthma    Anxiety and depression    Bipolar 2 disorder    Posttraumatic stress disorder    Previous  section    History of IUFD    Marijuana use during pregnancy    Plantar fasciitis    Body mass index (BMI) 40.0-44.9, adult    Subchorionic hemorrhage of placenta, antepartum    Obesity in pregnancy, antepartum    Maternal anemia in pregnancy, antepartum    GBS (group B Streptococcus carrier), +RV culture, currently pregnant        Objective    Vitals  Temp:  Temp:  [97.5 °F (36.4 °C)-98.2 °F (36.8 °C)] 97.8 °F (36.6 °C)  Temp src: Oral  BP:  BP: (131-138)/(71-89) 138/77  Pulse:  Heart Rate:  [81-96] 94  RR:   Resp:  [16-18] 18  Weight: 126 kg (277 lb 12.5 oz)  BMI:  Body mass index is 49.22 kg/m².      General Awake, alert, no distress  Abdomen Soft, non-distended, fundus firm, 2 cm below umbilicus, appropriately tender  Incision  Intact, no erythema or exudate  Extremities Calves NT bilaterally         I/O last 3 completed shifts:  In: -   Out: 700 [Urine:700]    LABS:   Lab Results   Component Value Date    WBC 11.70 (H) 2024    HGB 11.0 (L) 2024    HCT 34.5 2024    MCV 85.8 2024     2024       Infant: male       Assessment   1.  POD 3    Plan: Doing well.  DC home today      History of IUFD    Asthma    Anxiety and depression    Bipolar 2 disorder    Posttraumatic stress disorder    Previous  section    Marijuana use during pregnancy    Body mass index (BMI) 40.0-44.9, adult    Obesity in pregnancy, antepartum    Maternal anemia in pregnancy, antepartum    GBS (group B Streptococcus carrier), +RV culture, currently pregnant      Parul Bowles  MD  9/1/2024 10:54 EDT

## 2024-09-02 DIAGNOSIS — F32.A ANXIETY AND DEPRESSION: ICD-10-CM

## 2024-09-02 DIAGNOSIS — F41.9 ANXIETY AND DEPRESSION: ICD-10-CM

## 2024-09-03 ENCOUNTER — TELEPHONE (OUTPATIENT)
Dept: OBSTETRICS AND GYNECOLOGY | Age: 34
End: 2024-09-03

## 2024-09-03 NOTE — TELEPHONE ENCOUNTER
Caller: Lake Sprague    Relationship: Self    Best call back number: [phone]    What is the best time to reach you: ANY    Who are you requesting to speak with (clinical staff, provider,  specific staff member): MARY        What was the call regarding: PT RETURNING CALL TO MARY

## 2024-09-04 LAB — CANNABINOIDS UR QL CFM: NORMAL

## 2024-09-04 NOTE — PROGRESS NOTES
"Continued Stay Note  [facility]     Patient Name: Lake Sprague  MRN: [id number]  Today's Date: 9/4/2024    Admit Date: 8/29/2024    Plan: Infant may discharge to mother when medically ready; CSW will follow cord. ELOY Laguna.   Discharge Plan       Row Name 09/04/24 1105       Plan    Plan Comments Mother: Lake Sprague \"Lake\", MRN: [id number]; infant: Bernarda \"Omid\" Celestine, MRN: [id number]. CSW reviewed cord toxicology for infant, and it was positive for Delta-9 Carboxy; this is lab confirmed. CSW submitted a CPS report (WebID # [id number]). ELOY Laguna.                   Discharge Codes    No documentation.                 Expected Discharge Date and Time       Expected Discharge Date Expected Discharge Time    Sep 1, 2024               ERENDIRA Anna    "

## 2024-09-04 NOTE — TELEPHONE ENCOUNTER
Caller: Lake Sprague    Relationship: Self    Best call back number: [phone]    Do you know the name of the person who called: MARY    What was the call regarding: PT RETURNING A MISSED CALL

## 2024-09-05 ENCOUNTER — TELEMEDICINE (OUTPATIENT)
Dept: PSYCHIATRY | Facility: CLINIC | Age: 34
End: 2024-09-05
Payer: MEDICAID

## 2024-09-05 DIAGNOSIS — F43.10 POST TRAUMATIC STRESS DISORDER (PTSD): ICD-10-CM

## 2024-09-05 DIAGNOSIS — F31.81 BIPOLAR II DISORDER: ICD-10-CM

## 2024-09-05 DIAGNOSIS — F41.1 GENERALIZED ANXIETY DISORDER: Primary | ICD-10-CM

## 2024-09-05 NOTE — PROGRESS NOTES
Date: 2024  Time In: 10:00 EDT  Time out: 10:56 AM EST    This provider is located at [facility], [address] [place], [place], [facility], using a secure [employer] Video Visit through [employer]. Patient is being seen remotely via telehealth at their home address is located in [place]. Patient stated they are in a secure environment for this session. The patient's condition being diagnosed and treated is appropriate for telemedicine. The provider identified themself as well as their credentials. The patient, or  patient's legal guardian consent to be seen remotely, and when consent is given they understand that the consent allows for patient identifiable information to be sent to a third party as needed. They may refuse to be seen remotely at any time. The electronic data is encrypted and password protected, and the patient's or  legal guardian has been advised of the potential risks to privacy not withstanding such measures.   PT Identifiers used: Name and .    You have chosen to receive care through a telehealth visit.  Do you consent to use a video/audio connection for your medical care today? Yes    Subjective   Lake Sprague is a 34 y.o. female who presents today for follow up    Chief Complaint:   Chief Complaint   Patient presents with    Anxiety    Depression    PTSD        Data:  Pt reported having her baby since last session, Omid. Pt reported her MIL (staying in the home)  and mother (staying out of the home) are in town to aid with the new baby. Pt reported that there has been conflict with her mother. Pt reported she usually avoids conflict but confronted mom about issues.Pt reported that her MIL has been helpful with the adjustment but she feels like there are too many people in the home. Pt reported that she is feeling a little overwhelmed with the adjustment of bringing a baby home but thus far manageable. Pt reported she is being mindful of depression  symptoms.      Clinical Maneuvering/Intervention: Assisted patient in processing above session content; acknowledged and normalized patient’s thoughts, feelings, and concerns.  Rationalized patient thought process regarding concerns presented at session.  Discussed triggers associated with patient's  anxiety , depression , and PTSD Also discussed coping skills for patient to implement such as grounding , mindfulness , increasing activity , self care , and positive self talk boundaries, delegating tasks    Allowed patient to freely discuss issues without interruption or judgment. Provided safe, confidential environment to facilitate the development of positive therapeutic relationship and encourage open, honest communication. Assisted patient in identifying risk factors which would indicate the need for higher level of care including thoughts to harm self or others and/or self-harming behavior and encouraged patient to contact this office, call 911, or present to the nearest emergency room should any of these events occur. Discussed crisis intervention services and means to access. Patient adamantly and convincingly denies current suicidal or homicidal ideation or perceptual disturbance.    Assessment: Pt was alert and oriented x3. Pt was located in a secure location for confidentiality/privacy. Pt appears motivated to improve MH and overall quality of life. Pt appears to be adjusting okay to adding a new baby in the home. Pt appears to feel somewhat overwhelmed with the amount of people in the home currently, although manageable. Pt reflected on her and her mothers relationship and reoccurring conflicts. Monitor for PPD with time.    Patient appears to maintain relative stability as compared to their baseline.  However, patient continues to struggle with   Chief Complaint   Patient presents with    Anxiety    Depression    PTSD    which continues to cause impairment in important areas of functioning.  A result, they  can be reasonably expected to continue to benefit from treatment and would likely be at increased risk for decompensation otherwise.      Mental Status Exam:   Hygiene:   good  Cooperation:  Cooperative  Eye Contact:  Good  Psychomotor Behavior:  Appropriate  Affect:  Appropriate  Mood: normal  Speech:  Normal  Thought Process:  Linear  Thought Content:  Normal  Suicidal:  None  Homicidal:  None  Hallucinations:  None  Delusion:  None  Memory:  Intact  Orientation:  Person, Place, and Time  Reliability:  fair  Insight:  Fair  Judgement:  Fair  Impulse Control:  Fair  Physical/Medical Issues:  No      Patient's Support Network Includes:  significant other and extended family    Functional Status: No impairment    Progress toward goal: Not at goal    Prognosis: Fair with Ongoing Treatment     Plan:     Patient will continue in individual outpatient therapy with focus on improved functioning and coping skills, maintaining stability, and avoiding decompensation and the need for higher level of care.    Patient will adhere to medication regimen as prescribed and report any side effects. Patient will contact this office, call 911 or present to the nearest emergency room should suicidal or homicidal ideations occur. Provide Cognitive Behavioral Therapy and Solution Focused Therapy to improve functioning, maintain stability, and avoid decompensation and the need for higher level of care.     Return in about 7 weeks (around 10/24/2024).      VISIT DIAGNOSIS:    Diagnosis Plan   1. Generalized anxiety disorder        2. Bipolar II disorder        3. Post traumatic stress disorder (PTSD)         10:00 EDT         This document has been electronically signed by Lay Le LCSW  September 5, 2024      Part of this note may be an electronic transcription/translation of spoken language to printed text using the Dragon Dictation System.

## 2024-09-09 ENCOUNTER — POSTPARTUM VISIT (OUTPATIENT)
Dept: OBSTETRICS AND GYNECOLOGY | Age: 34
End: 2024-09-09
Payer: MEDICAID

## 2024-09-09 ENCOUNTER — PATIENT OUTREACH (OUTPATIENT)
Dept: LABOR AND DELIVERY | Facility: HOSPITAL | Age: 34
End: 2024-09-09
Payer: MEDICAID

## 2024-09-09 VITALS
WEIGHT: 258 LBS | BODY MASS INDEX: 45.71 KG/M2 | DIASTOLIC BLOOD PRESSURE: 76 MMHG | HEIGHT: 63 IN | SYSTOLIC BLOOD PRESSURE: 124 MMHG

## 2024-09-09 DIAGNOSIS — Z98.891 S/P CESAREAN SECTION: Primary | ICD-10-CM

## 2024-09-09 PROCEDURE — 99212 OFFICE O/P EST SF 10 MIN: CPT | Performed by: PHYSICIAN ASSISTANT

## 2024-09-09 RX ORDER — ACETAMINOPHEN 500 MG
500 TABLET ORAL EVERY 6 HOURS PRN
COMMUNITY

## 2024-09-09 NOTE — PROGRESS NOTES
Patient presents for a postpartum visit. She is 2 weeks postpartum following a low cervical transverse  section. I have fully reviewed the prenatal and intrapartum course. The delivery was at 37 gestational weeks. Outcome: repeat  section, low transverse incision. Anesthesia: spinal. Postpartum course has been good. Baby's course has been good. Baby is feeding by breast. Bleeding no bleeding. Bowel function is normal. Bladder function is normal. Patient is not sexually active. Contraception method is abstinence. Postpartum depression screening: negative.    She is taking zoloft and feels as if this is working well for her  Denies SI or HI    Has good help at home from dad  Mom and MIL are available to help as well        Postpartum inventory   Infant feeding: Combination  Postpartum pain: Moderate (pain 4-6)  Vaginal bleeding : Mostly light - only using liner  Depression/Anxiety: Minimal depression; Moderate anxiety  Contracepton: Nexplanon    EPD Scale: Thought of Harming Self: 1-->hardly ever  Dafter  Depression Score: 13     The following portions of the patient's history were reviewed and updated as appropriate: allergies, current medications, past family history, past medical history, past social history, past surgical history, and problem list.    Review of Systems  Genitourinary:positive for s/p csearean section        Vitals:    24 1021   BP: 124/76       General:  alert, appears stated age, and cooperative    Breasts:  na   Lungs: na   Heart:  na   Abdomen: Incision is well approximated. Some granulation tissue noted on left edge. Silver nitrate applied. No s/s of infection     Vulva:  not evaluated   Vagina: not evaluated   Cervix:  na   Corpus: not examined   Adnexa:  not evaluated   Rectal Exam: Not performed.         2 wk postpartum exam. Pap smear not done at today's visit.    1. Contraception: abstinence  2. Will plan nexplanon. Schedule pp exam and nexplanon  insert  3. Follow up in: 4 weeks or as needed.

## 2024-09-09 NOTE — OUTREACH NOTE
[facility]  Postpartum Check-In    Questions/Answers      Flowsheet Row Responses   Visit Setting Telephone   Best Method for Contacting Cell   OB Discharge Note Reviewed  Reviewed   OB Discharge Navigator Reviewed  Reviewed   OB Discharge Medications Reviewed  Reviewed    discharged home with mother? Yes   Current Pain Levels 0-10 1   At Rest Pain Levels 0-10 1   Pain level with activity 0-10 3   Acceptable Pain Level 0-10 5   Pain Location Abdomen   Pain Description Aching   How do you treat your pain? Medications, Position Changes   Verbalized Emotional State Acceptance   Family/Support Network Family, Mother, Significant Other   Level of Involvement in Care Attentive, Interactive, Supportive   Do you feel comfortable in your relationship with your baby? Yes   Have members of your household adjusted to your baby? Yes   Is the baby's father supportive and/or involved with the baby? Yes   How does your partner feel about the baby? Happy, Involved   Do you feel safe at home, school and work? Yes   Are you in a relationship with someone who threatens you or hurts you? No   Do you have the resources to keep yourself and your baby healthy and safe? Yes   Lochia (per patient report) None noted   Amount None   Lochia Odor None   Is patient breastfeeding? Yes, pumping   Breast Care Breast Pads, Supportive Bra   pumping - Left Breast Soft, Nontender   Pumping - Right Breast Soft, Nontender   Pumping - Left Nipple Intact   Pumping - Right Nipple Intact   Frequency q2-3h   Pumping Quantity 1.5oz   Storage of Milk backfeeding   Postpartum Depression Screening Education Education Provided   Doctor Appointments: Education Provided   Breastfeeding Education Education Provided   Family Planning Education Education Provided   Postpartum Care Education Education Provided   S & S to report Education Provided   Followup Appointments Made Yes   Well Child Visit Appointments Made Yes   Appointment Date 24    Provider/Agency Imelda HEBERTKRAIG   Well Child Checkup Provider Name [facility]   Well Child Check Up Date: 24   Did you complete the visit? Yes   Were there any specific concerns? Yes   Concerns: wt check-up 3oz at today's appt   Has additional follow-up with pediatrician or specialist been recommended? No   Umbilical Cord No reported signs or symptoms   Was the baby circumcised? Yes   Circumcision care and signs/symptoms to report Reviewed   Feeding Readiness Cues: Eager   Infant Feeding Method Expressed Breast Milk, Formula, Breast   Is a lactation referral indicated? No   Frequency of feedings q2-3h   Formula PO (mL) 2oz   Formula/Expressed Milk frequency of feedings: q2h   Expressed milk PO (mL) 1.5oz   Expressed milk- frequency of feedings q2h   Number of wet diapers x 24 hours 8   Last BM x 24 hours 2-3   What safe sleep surface is available? Bassinet   Are there stuffed animals, toys, pillows, quilts, blankets, wedges, positioners, bumpers or other loose bedding in the infant's sleeping environment? No   Where does the baby usually sleep? Bassinet   Does the baby ever share a sleep surface with a sibling, adult or pet? No   Does the baby ever share a sleep surface in a bed, couch, recliner or other? No   What position do you place your baby to sleep for naps? Back   What position do you place your baby to sleep at night Back   Are you and/or other caregivers smoking inside or outside the baby's home? No   Is the infant dressed appropiately for the temperature of the home? Yes   Do you use a clean, dry pacifier that is not attached to a string or stuffed animal? No            Review of Systems    Most Recent [place]  Depression Scale Score (EPDS)    Performed by a clinician: 0 (2024  1:18 PM)    Received via [employer] questionnaire: 13 (2024)     5 Ps Screen  completed    Pt doing well today.  Has been seeing [facility], EPDS now 0.  Has all supplies.  Family an FOB helping at home.  WIC is active.   Breast, pumping, and formula.  [place] and PP Maternal warning s/s reviewed, pt verbalized understanding.  Grad letter sent.  Chart to call center.    Олег Ndiaye RN  Maternity Nurse Navigator    2024, 13:20 EDT

## 2024-09-12 ENCOUNTER — TELEMEDICINE (OUTPATIENT)
Dept: PSYCHIATRY | Facility: CLINIC | Age: 34
End: 2024-09-12
Payer: MEDICAID

## 2024-09-12 DIAGNOSIS — F41.1 GENERALIZED ANXIETY DISORDER: Primary | ICD-10-CM

## 2024-09-12 DIAGNOSIS — F43.10 POST TRAUMATIC STRESS DISORDER (PTSD): ICD-10-CM

## 2024-09-12 DIAGNOSIS — F31.81 BIPOLAR II DISORDER: ICD-10-CM

## 2024-09-12 NOTE — PROGRESS NOTES
Date: 2024  Time In: 11:03 EDT  Time out: 11:58 AM EST    This provider is located at [facility], [facility] [place], [place], [facility] [place][note 2], using a secure [employer] Video Visit through [employer]. Patient is being seen remotely via telehealth at their home address is located in [place]. Patient stated they are in a secure environment for this session. The patient's condition being diagnosed and treated is appropriate for telemedicine. The provider identified themself as well as their credentials. The patient, or  patient's legal guardian consent to be seen remotely, and when consent is given they understand that the consent allows for patient identifiable information to be sent to a third party as needed. They may refuse to be seen remotely at any time. The electronic data is encrypted and password protected, and the patient's or  legal guardian has been advised of the potential risks to privacy not withstanding such measures.   PT Identifiers used: Name and .    You have chosen to receive care through a telehealth visit.  Do you consent to use a video/audio connection for your medical care today? Yes    Subjective   Lake Sprague is a 34 y.o. female who presents today for follow up    Chief Complaint:   Chief Complaint   Patient presents with    Anxiety    Depression    PTSD        Data:  Pt reported increased stress since last week. Pt reported that the hospital called CPS on her, pt reported her UDS was negative. Pt reported feeling down yesterday due to low milk production. Pt reported isolating self when she feels down. Pt reported during isolation she is able to process and create a plan to improve mood. Pt reported partner can overstep boundaries trying to aid her with fixing issues. Pt reported feeling down has been a trigger to drink in the past. Clinician and pt identified healthy coping skills in session.      Clinical Maneuvering/Intervention: processing, validation,  CBT techniques. Assisted patient in processing above session content; acknowledged and normalized patient’s thoughts, feelings, and concerns.  Rationalized patient thought process regarding concerns presented at session.  Discussed triggers associated with patient's  anxiety , depression , and PTSD Also discussed coping skills for patient to implement such as grounding , mindfulness , increasing activity , self care , and acknowledging/accepting situations, boundaries  focusing on what she can control    Allowed patient to freely discuss issues without interruption or judgment. Provided safe, confidential environment to facilitate the development of positive therapeutic relationship and encourage open, honest communication. Assisted patient in identifying risk factors which would indicate the need for higher level of care including thoughts to harm self or others and/or self-harming behavior and encouraged patient to contact this office, call 911, or present to the nearest emergency room should any of these events occur. Discussed crisis intervention services and means to access. Patient adamantly and convincingly denies current suicidal or homicidal ideation or perceptual disturbance.    Assessment: Pt was alert and oriented x3. Pt was located in a secure location for confidentiality/privacy. Pt appears under increased stress and anxiety due to recent CPS visit. Pt appears to be able to identify triggers relatively well. Pt appeared receptive to identifying triggers and implementing a coping skills to prevent negative patterns. Pt appears motivated to improve MH and overall quality of life. Pts partner does not appear fully receptive to boundaries through reports, making it difficult for pt to process experiences without interruption.     Patient appears to maintain relative stability as compared to their baseline.  However, patient continues to struggle with   Chief Complaint   Patient presents with    Anxiety     Depression    PTSD    which continues to cause impairment in important areas of functioning.  A result, they can be reasonably expected to continue to benefit from treatment and would likely be at increased risk for decompensation otherwise.      Mental Status Exam:   Hygiene:   good  Cooperation:  Cooperative  Eye Contact:  Good  Psychomotor Behavior:  Appropriate  Affect:  Appropriate  Mood: anxious  Speech:  Normal  Thought Process:  Linear  Thought Content:  Mood congruent  Suicidal:  None  Homicidal:  None  Hallucinations:  None  Delusion:  None  Memory:  Intact  Orientation:  Person, Place, and Time  Reliability:  fair  Insight:  Fair  Judgement:  Fair  Impulse Control:  Fair  Physical/Medical Issues:  No        Patient's Support Network Includes:  significant other    Functional Status: No impairment    Progress toward goal: Not at goal    Prognosis: Fair with Ongoing Treatment     Plan:     Patient will continue in individual outpatient therapy with focus on improved functioning and coping skills, maintaining stability, and avoiding decompensation and the need for higher level of care.    Patient will adhere to medication regimen as prescribed and report any side effects. Patient will contact this office, call 911 or present to the nearest emergency room should suicidal or homicidal ideations occur. Provide Cognitive Behavioral Therapy and Solution Focused Therapy to improve functioning, maintain stability, and avoid decompensation and the need for higher level of care.     Return in about 5 weeks (around 10/17/2024).      VISIT DIAGNOSIS:    Diagnosis Plan   1. Generalized anxiety disorder        2. Bipolar II disorder        3. Post traumatic stress disorder (PTSD)         11:03 EDT         This document has been electronically signed by Lay Le LCSW  September 12, 2024      Part of this note may be an electronic transcription/translation of spoken language to printed text using the Dragon  Dictation System.

## 2024-09-16 ENCOUNTER — PATIENT OUTREACH (OUTPATIENT)
Dept: CALL CENTER | Facility: HOSPITAL | Age: 34
End: 2024-09-16
Payer: MEDICAID

## 2024-10-07 ENCOUNTER — OFFICE VISIT (OUTPATIENT)
Dept: OBSTETRICS AND GYNECOLOGY | Age: 34
End: 2024-10-07
Payer: MEDICAID

## 2024-10-07 ENCOUNTER — POSTPARTUM VISIT (OUTPATIENT)
Dept: OBSTETRICS AND GYNECOLOGY | Age: 34
End: 2024-10-07
Payer: MEDICAID

## 2024-10-07 VITALS
DIASTOLIC BLOOD PRESSURE: 72 MMHG | SYSTOLIC BLOOD PRESSURE: 108 MMHG | BODY MASS INDEX: 44.83 KG/M2 | WEIGHT: 253 LBS | HEIGHT: 63 IN

## 2024-10-07 DIAGNOSIS — B96.89 BV (BACTERIAL VAGINOSIS): ICD-10-CM

## 2024-10-07 DIAGNOSIS — N76.0 BV (BACTERIAL VAGINOSIS): ICD-10-CM

## 2024-10-07 DIAGNOSIS — Z30.017 NEXPLANON INSERTION: Primary | ICD-10-CM

## 2024-10-07 PROBLEM — O99.019 MATERNAL ANEMIA IN PREGNANCY, ANTEPARTUM: Status: RESOLVED | Noted: 2024-07-25 | Resolved: 2024-10-07

## 2024-10-07 PROBLEM — O99.320 MARIJUANA USE DURING PREGNANCY: Status: RESOLVED | Noted: 2024-02-19 | Resolved: 2024-10-07

## 2024-10-07 PROBLEM — F12.90 MARIJUANA USE DURING PREGNANCY: Status: RESOLVED | Noted: 2024-02-19 | Resolved: 2024-10-07

## 2024-10-07 PROBLEM — O46.8X9 SUBCHORIONIC HEMORRHAGE OF PLACENTA, ANTEPARTUM: Status: RESOLVED | Noted: 2024-04-25 | Resolved: 2024-10-07

## 2024-10-07 PROBLEM — O99.820 GBS (GROUP B STREPTOCOCCUS CARRIER), +RV CULTURE, CURRENTLY PREGNANT: Status: RESOLVED | Noted: 2024-08-19 | Resolved: 2024-10-07

## 2024-10-07 PROBLEM — Z98.891 PREVIOUS CESAREAN SECTION: Status: RESOLVED | Noted: 2024-02-12 | Resolved: 2024-10-07

## 2024-10-07 PROBLEM — O99.210 OBESITY IN PREGNANCY, ANTEPARTUM: Status: RESOLVED | Noted: 2024-06-27 | Resolved: 2024-10-07

## 2024-10-07 PROBLEM — Z87.59 HISTORY OF IUFD: Status: RESOLVED | Noted: 2024-02-12 | Resolved: 2024-10-07

## 2024-10-07 LAB
B-HCG UR QL: NEGATIVE
EXPIRATION DATE: ABNORMAL
INTERNAL NEGATIVE CONTROL: NEGATIVE
INTERNAL POSITIVE CONTROL: NEGATIVE
Lab: ABNORMAL

## 2024-10-07 PROCEDURE — 11981 INSERTION DRUG DLVR IMPLANT: CPT | Performed by: PHYSICIAN ASSISTANT

## 2024-10-07 PROCEDURE — 81025 URINE PREGNANCY TEST: CPT | Performed by: PHYSICIAN ASSISTANT

## 2024-10-07 PROCEDURE — 99213 OFFICE O/P EST LOW 20 MIN: CPT | Performed by: OBSTETRICS & GYNECOLOGY

## 2024-10-07 PROCEDURE — 1160F RVW MEDS BY RX/DR IN RCRD: CPT | Performed by: PHYSICIAN ASSISTANT

## 2024-10-07 PROCEDURE — 1159F MED LIST DOCD IN RCRD: CPT | Performed by: PHYSICIAN ASSISTANT

## 2024-10-07 RX ORDER — METRONIDAZOLE 500 MG/1
500 TABLET ORAL 2 TIMES DAILY
Qty: 14 TABLET | Refills: 0 | Status: SHIPPED | OUTPATIENT
Start: 2024-10-07

## 2024-10-07 RX ORDER — IBUPROFEN 800 MG/1
800 TABLET, FILM COATED ORAL EVERY 8 HOURS PRN
Qty: 40 TABLET | Refills: 1 | Status: SHIPPED | OUTPATIENT
Start: 2024-10-07

## 2024-10-09 ENCOUNTER — HOSPITAL ENCOUNTER (OUTPATIENT)
Dept: CT IMAGING | Facility: HOSPITAL | Age: 34
Discharge: HOME | End: 2024-10-09
Admitting: INTERNAL MEDICINE
Payer: MEDICAID

## 2024-10-09 ENCOUNTER — TRANSCRIBE ORDERS (OUTPATIENT)
Dept: ADMINISTRATIVE | Facility: HOSPITAL | Age: 34
End: 2024-10-09
Payer: MEDICAID

## 2024-10-09 DIAGNOSIS — R06.02 SHORTNESS OF BREATH: ICD-10-CM

## 2024-10-09 DIAGNOSIS — R07.9 CHEST PAIN, UNSPECIFIED TYPE: ICD-10-CM

## 2024-10-09 DIAGNOSIS — R07.9 CHEST PAIN, UNSPECIFIED TYPE: Primary | ICD-10-CM

## 2024-10-09 PROCEDURE — 71275 CT ANGIOGRAPHY CHEST: CPT

## 2024-10-09 PROCEDURE — 25510000001 IOPAMIDOL PER 1 ML: Performed by: INTERNAL MEDICINE

## 2024-10-09 RX ORDER — IOPAMIDOL 755 MG/ML
100 INJECTION, SOLUTION INTRAVASCULAR
Status: COMPLETED | OUTPATIENT
Start: 2024-10-09 | End: 2024-10-09

## 2024-10-09 RX ADMIN — IOPAMIDOL 95 ML: 755 INJECTION, SOLUTION INTRAVENOUS at 11:14

## 2024-10-09 NOTE — NURSING NOTE
"1115 Patient arrived in triage bay 6 as a hold and call patient post CTA Chest. No acute distress, resting in chair.  1130 Dr. De Souza phoned here states \"No PE, nothing acute, the report is available to look at\".  1131 Phoned this info to Dr. Espinal. Repeated and verified, she states \"Ok to let her go\". Will follow up.  1137 IV out, patient ambulatory toward exit. No distress.  "

## 2024-10-25 ENCOUNTER — OFFICE VISIT (OUTPATIENT)
Dept: FAMILY MEDICINE CLINIC | Facility: CLINIC | Age: 34
End: 2024-10-25
Payer: MEDICAID

## 2024-10-25 VITALS
HEART RATE: 80 BPM | OXYGEN SATURATION: 96 % | SYSTOLIC BLOOD PRESSURE: 114 MMHG | HEIGHT: 63 IN | BODY MASS INDEX: 45 KG/M2 | TEMPERATURE: 97.3 F | DIASTOLIC BLOOD PRESSURE: 70 MMHG | WEIGHT: 254 LBS

## 2024-10-25 DIAGNOSIS — M67.432 GANGLION CYST OF VOLAR ASPECT OF LEFT WRIST: ICD-10-CM

## 2024-10-25 DIAGNOSIS — R91.1 LUNG NODULE: ICD-10-CM

## 2024-10-25 DIAGNOSIS — F32.A ANXIETY AND DEPRESSION: Primary | ICD-10-CM

## 2024-10-25 DIAGNOSIS — F41.9 ANXIETY AND DEPRESSION: Primary | ICD-10-CM

## 2024-10-25 PROCEDURE — 1159F MED LIST DOCD IN RCRD: CPT | Performed by: STUDENT IN AN ORGANIZED HEALTH CARE EDUCATION/TRAINING PROGRAM

## 2024-10-25 PROCEDURE — 1160F RVW MEDS BY RX/DR IN RCRD: CPT | Performed by: STUDENT IN AN ORGANIZED HEALTH CARE EDUCATION/TRAINING PROGRAM

## 2024-10-25 PROCEDURE — 1125F AMNT PAIN NOTED PAIN PRSNT: CPT | Performed by: STUDENT IN AN ORGANIZED HEALTH CARE EDUCATION/TRAINING PROGRAM

## 2024-10-25 PROCEDURE — 99213 OFFICE O/P EST LOW 20 MIN: CPT | Performed by: STUDENT IN AN ORGANIZED HEALTH CARE EDUCATION/TRAINING PROGRAM

## 2024-10-25 NOTE — PROGRESS NOTES
"Chief Complaint  Cyst (Left wrist joint), Follow-up (Ct imaging.), and Postpartum Care (Post partum check in.)    Subjective        Lake Sprague presents to [facility]  History of Present Illness  34-year-old female with anxiety and depression, asthma who presents for left wrist cyst and follow-up after ER visit.    Patient went to the ER about a month ago for chest pain.  She was a month postpartum.  CTA negative for pulmonary embolism.  She had a small pleural nodule -benign.  Believed to be musculoskeletal in nature.  Has completely resolved.    She has noticed recurrence of a left wrist cyst - history of 2 previous surgical removals when she lived in [place].    Doing well postpartum. Mood stable. Breastfeeding. Son is 2months old.       Objective   Vital Signs:  /70   Pulse 80   Temp 97.3 °F (36.3 °C)   Ht 160 cm (63\")   Wt 115 kg (254 lb)   SpO2 96%   BMI 44.99 kg/m²   Estimated body mass index is 44.99 kg/m² as calculated from the following:    Height as of this encounter: 160 cm (63\").    Weight as of this encounter: 115 kg (254 lb).      Physical Exam  Constitutional:       General: She is not in acute distress.  Eyes:      Conjunctiva/sclera: Conjunctivae normal.   Cardiovascular:      Rate and Rhythm: Normal rate and regular rhythm.   Pulmonary:      Effort: Pulmonary effort is normal. No respiratory distress.   Abdominal:      Palpations: Abdomen is soft.      Tenderness: There is no abdominal tenderness.   Musculoskeletal:      Comments: Small, mobile ganglion cyst of left volar aspect of wrist   Neurological:      Mental Status: She is alert and oriented to person, place, and time.   Psychiatric:         Mood and Affect: Mood normal.         Behavior: Behavior normal.        Result Review :  The following data was reviewed by: Nydia Vasquez MD on 10/25/2024:  Common labs          6/27/2024    12:24 8/29/2024    05:55 8/30/2024    09:32   Common Labs   WBC 10.9  " 11.59  11.70    Hemoglobin 10.8  11.1  11.0    Hematocrit 32.8  33.9  34.5    Platelets 318  324  313      Data reviewed : see HPI           Assessment and Plan   Diagnoses and all orders for this visit:    1. Anxiety and depression (Primary)  Assessment & Plan:  With therapist. On zoloft 50mg daily.  Controlled. Continue current mgmt.       2. Ganglion cyst of volar aspect of left wrist  -     Ambulatory Referral to Hand Surgery    3. Lung nodule  Comments:  Incidental on CTA Chest. CP resolved - [facility]. Nothing further needed at this time.             Follow Up   No follow-ups on file.  Patient was given instructions and counseling regarding her condition or for health maintenance advice. Please see specific information pulled into the AVS if appropriate.

## 2024-11-11 ENCOUNTER — TELEMEDICINE (OUTPATIENT)
Dept: PSYCHIATRY | Facility: CLINIC | Age: 34
End: 2024-11-11
Payer: MEDICAID

## 2024-11-11 DIAGNOSIS — F43.10 POST TRAUMATIC STRESS DISORDER (PTSD): ICD-10-CM

## 2024-11-11 DIAGNOSIS — F41.1 GENERALIZED ANXIETY DISORDER: Primary | ICD-10-CM

## 2024-11-11 DIAGNOSIS — F31.81 BIPOLAR II DISORDER: ICD-10-CM

## 2024-11-11 PROCEDURE — 90837 PSYTX W PT 60 MINUTES: CPT

## 2024-11-11 NOTE — PROGRESS NOTES
Date: 2024  Time In: 09:04 EST  Time out: 10:01 AM EST    This provider is located at [facility], [facility] [place], [place], [facility], using a secure [employer] Video Visit through [employer]. Patient is being seen remotely via telehealth at their home address is located in [place]. Patient stated they are in a secure environment for this session. The patient's condition being diagnosed and treated is appropriate for telemedicine. The provider identified themself as well as their credentials. The patient, or  patient's legal guardian consent to be seen remotely, and when consent is given they understand that the consent allows for patient identifiable information to be sent to a third party as needed. They may refuse to be seen remotely at any time. The electronic data is encrypted and password protected, and the patient's or  legal guardian has been advised of the potential risks to privacy not withstanding such measures.   PT Identifiers used: Name and .    You have chosen to receive care through a telehealth visit.  Do you consent to use a video/audio connection for your medical care today? Yes    Subjective   Lake Sprague is a 34 y.o. female who presents today for follow up    Chief Complaint:   Chief Complaint   Patient presents with    Anxiety    PTSD    Depression        Data: Pt reported oversleeping her last appointment. Pt reported that her CPS case has been closed since last appointment, relieved. Pt reported that she has returned to work, anxious the first couple of days. Pt reflected on issues with her partner and how he doesn't hear her out. Pt reported that partner has not been as helpful as much as he should with the baby. Pt reported having SI thoughts when under increased stressed with no plan or intent. Pt reported her partner was aware of this. Clinician and pt identified protective factors during session. Pt reflected on her support who she can call when  she needs aid. Pt reported that partners mother is coming to aid with the baby for a month during the holiday season.      Clinical Maneuvering/Intervention: Assisted patient in processing above session content; acknowledged and normalized patient’s thoughts, feelings, and concerns.  Rationalized patient thought process regarding concerns presented at session.  Discussed triggers associated with patient's  anxiety , depression , and PTSD Also discussed coping skills for patient to implement such as grounding , mindfulness , self care , positive self talk , and communication, expectations    Allowed patient to freely discuss issues without interruption or judgment. Provided safe, confidential environment to facilitate the development of positive therapeutic relationship and encourage open, honest communication. Assisted patient in identifying risk factors which would indicate the need for higher level of care including thoughts to harm self or others and/or self-harming behavior and encouraged patient to contact this office, call 911, or present to the nearest emergency room should any of these events occur. Discussed crisis intervention services and means to access. Patient adamantly and convincingly denies current suicidal or homicidal ideation or perceptual disturbance.    Assessment: Pt was alert and oriented x3. Pt was located in a secure location for confidentiality/privacy. Pt appears under increased stress with partner and  phase. Pt appears to have had SI since last session, no plan or intent. Pt appeared receptive to accessing higher level of care if thoughts develop plan. Pt will have aid from partners mom soon with her visiting for a month, relieving stress. Pts transition back to work appears to be improving with time.    Patient appears to maintain relative stability as compared to their baseline.  However, patient continues to struggle with   Chief Complaint   Patient presents with    Anxiety     PTSD    Depression    which continues to cause impairment in important areas of functioning.  A result, they can be reasonably expected to continue to benefit from treatment and would likely be at increased risk for decompensation otherwise.      Mental Status Exam:   Hygiene:   fair  Cooperation:  Cooperative  Eye Contact:  Fair  Psychomotor Behavior:  Appropriate  Affect:  Appropriate  Mood: normal  Speech:  Normal  Thought Process:  Linear  Thought Content:  Normal  Suicidal:  None  Homicidal:  None  Hallucinations:  None  Delusion:  None  Memory:  Intact  Orientation:  Grossly intact  Reliability:  fair  Insight:  Fair  Judgement:  Fair  Impulse Control:  Fair  Physical/Medical Issues:  No        Patient's Support Network Includes:   friends, limited healthy support    Functional Status: No impairment    Progress toward goal: Not at goal    Prognosis: Fair with Ongoing Treatment     Plan: add to wait list. Continue to assess for SI as needed.    Patient will continue in individual outpatient therapy with focus on improved functioning and coping skills, maintaining stability, and avoiding decompensation and the need for higher level of care.    Patient will adhere to medication regimen as prescribed and report any side effects. Patient will contact this office, call 911 or present to the nearest emergency room should suicidal or homicidal ideations occur. Provide Cognitive Behavioral Therapy and Solution Focused Therapy to improve functioning, maintain stability, and avoid decompensation and the need for higher level of care.     Return in about 8 weeks (around 1/6/2025).      VISIT DIAGNOSIS:    Diagnosis Plan   1. Generalized anxiety disorder        2. Bipolar II disorder        3. Post traumatic stress disorder (PTSD)         09:04 EST         This document has been electronically signed by Lay Le LCSW  November 11, 2024      Part of this note may be an electronic transcription/translation of  spoken language to printed text using the Dragon Dictation System.

## 2024-12-12 ENCOUNTER — TELEMEDICINE (OUTPATIENT)
Dept: PSYCHIATRY | Facility: CLINIC | Age: 34
End: 2024-12-12
Payer: MEDICAID

## 2024-12-12 DIAGNOSIS — F33.1 MAJOR DEPRESSIVE DISORDER, RECURRENT EPISODE, MODERATE: ICD-10-CM

## 2024-12-12 DIAGNOSIS — F41.1 GENERALIZED ANXIETY DISORDER: Primary | ICD-10-CM

## 2024-12-12 DIAGNOSIS — F31.81 BIPOLAR II DISORDER: ICD-10-CM

## 2024-12-12 DIAGNOSIS — F43.10 POST TRAUMATIC STRESS DISORDER (PTSD): ICD-10-CM

## 2024-12-12 NOTE — PROGRESS NOTES
Date: 2024  Time In: 15:12 EST  Time out: 3:51 PM EST    This provider is located at [facility], [address], [place], [place], [facility], using a secure [employer] Video Visit through [employer]. Patient is being seen remotely via telehealth at their home address is located in [place]. Patient stated they are in a secure environment for this session. The patient's condition being diagnosed and treated is appropriate for telemedicine. The provider identified themself as well as their credentials. The patient, or  patient's legal guardian consent to be seen remotely, and when consent is given they understand that the consent allows for patient identifiable information to be sent to a third party as needed. They may refuse to be seen remotely at any time. The electronic data is encrypted and password protected, and the patient's or  legal guardian has been advised of the potential risks to privacy not withstanding such measures.   PT Identifiers used: Name and .    You have chosen to receive care through a telehealth visit.  Do you consent to use a video/audio connection for your medical care today? Yes    Subjective   Lake Sprague is a 34 y.o. female who presents today for follow up    Chief Complaint:   Chief Complaint   Patient presents with    Anxiety    Depression    PTSD        Data: Pt was driving upon arrival to session. Pt was instructed to log back into session once arrived to her destination. Session started late, shorter session. Pts MIL is currently staying with her, aiding in caring for the baby. Pt reported that she has been having passive SI, denies a plan or intent. Pt reported that December is a struggle for her, multiple death anniversaries this month. Pt reported that she had a panic attack on Tuesday, identified contributing factors. Pt reported  that she has not been taking her medication, 'can't remember'.       Clinical Maneuvering/Intervention: Assisted  patient in processing above session content; acknowledged and normalized patient’s thoughts, feelings, and concerns.  Rationalized patient thought process regarding concerns presented at session.  Discussed triggers associated with patient's  anxiety , depression , and PTSD Also discussed coping skills for patient to implement such as grounding , mindfulness , increasing activity , self care , and positive self talk     Allowed patient to freely discuss issues without interruption or judgment. Provided safe, confidential environment to facilitate the development of positive therapeutic relationship and encourage open, honest communication. Assisted patient in identifying risk factors which would indicate the need for higher level of care including thoughts to harm self or others and/or self-harming behavior and encouraged patient to contact this office, call 911, or present to the nearest emergency room should any of these events occur. Discussed crisis intervention services and means to access. Patient adamantly and convincingly denies current suicidal or homicidal ideation or perceptual disturbance.    Assessment: Pt was alert and oriented x3. Pt was located in a secure location for confidentiality/privacy. Pt appears to have passive SI, no plan or intent at this time. Pt appeared receptive to contacting 911, [phone], a support or the office if thoughts increased -(developing a plan). Pt denies being a threat to self at this time. Pt was able to identify triggers relatively well. Pts lack of consistency with medication may be contributing to MH symptoms. Pt appeared restless during session, rocking/moving around. Pt appeared receptive to techniques and encouragements discussed in session.    Patient appears to maintain relative stability as compared to their baseline.  However, patient continues to struggle with   Chief Complaint   Patient presents with    Anxiety    Depression    PTSD    which continues to cause  impairment in important areas of functioning.  A result, they can be reasonably expected to continue to benefit from treatment and would likely be at increased risk for decompensation otherwise.      Mental Status Exam:   Hygiene:   good  Cooperation:  Cooperative  Eye Contact:  Good  Psychomotor Behavior:  Restless  Affect:  Appropriate  Mood: anxious  Speech:  Normal  Thought Process:  Linear  Thought Content:  Mood congruent  Suicidal:   passive  Homicidal:  None  Hallucinations:  None  Delusion:  None  Memory:  Intact  Orientation:  Grossly intact  Reliability:  fair  Insight:  Fair  Judgement:  Fair  Impulse Control:  Fair  Physical/Medical Issues:  No        Patient's Support Network Includes:  significant other    Functional Status: No impairment    Progress toward goal: Not at goal    Prognosis: Fair with Ongoing Treatment     Plan: Assess for SI as needed.    Patient will continue in individual outpatient therapy with focus on improved functioning and coping skills, maintaining stability, and avoiding decompensation and the need for higher level of care.    Patient will adhere to medication regimen as prescribed and report any side effects. Patient will contact this office, call 911 or present to the nearest emergency room should suicidal or homicidal ideations occur. Provide Cognitive Behavioral Therapy and Solution Focused Therapy to improve functioning, maintain stability, and avoid decompensation and the need for higher level of care.     Return in about 4 weeks (around 1/9/2025).      VISIT DIAGNOSIS:    Diagnosis Plan   1. Generalized anxiety disorder        2. Bipolar II disorder        3. Post traumatic stress disorder (PTSD)        4. Major depressive disorder, recurrent episode, moderate         15:00 EST         This document has been electronically signed by Lay Le LCSW  December 12, 2024      Part of this note may be an electronic transcription/translation of spoken language to  printed text using the Dragon Dictation System.

## 2025-01-13 ENCOUNTER — TELEMEDICINE (OUTPATIENT)
Dept: PSYCHIATRY | Facility: CLINIC | Age: 35
End: 2025-01-13
Payer: MEDICAID

## 2025-01-13 DIAGNOSIS — F31.81 BIPOLAR II DISORDER: Primary | ICD-10-CM

## 2025-01-13 DIAGNOSIS — F41.1 GENERALIZED ANXIETY DISORDER: ICD-10-CM

## 2025-01-13 DIAGNOSIS — F43.10 POST TRAUMATIC STRESS DISORDER (PTSD): ICD-10-CM

## 2025-01-13 PROCEDURE — 90837 PSYTX W PT 60 MINUTES: CPT

## 2025-01-13 NOTE — PROGRESS NOTES
Date: 2025  Time In: 09:01 EST  Time out: 9:55 AM EST    This provider is located at [facility], [facility], [place], [place], [facility], using a secure icehart Video Visit through [employer]. Patient is being seen remotely via telehealth at their home address is located in [place]. Patient stated they are in a secure environment for this session. The patient's condition being diagnosed and treated is appropriate for telemedicine. The provider identified themself as well as their credentials. The patient, or  patient's legal guardian consent to be seen remotely, and when consent is given they understand that the consent allows for patient identifiable information to be sent to a third party as needed. They may refuse to be seen remotely at any time. The electronic data is encrypted and password protected, and the patient's or  legal guardian has been advised of the potential risks to privacy not withstanding such measures.   PT Identifiers used: Name and .    You have chosen to receive care through a telehealth visit.  Do you consent to use a video/audio connection for your medical care today? Yes    Subjective   Lake Sprague is a 34 y.o. female who presents today for follow up    Chief Complaint:   Chief Complaint   Patient presents with    Anxiety    Depression    PTSD        Data: Pt reported that today has been hectic, routine thrown off with 2hr school delay for her older child. Pt reported that her MIL stayed for a month to aid with taking care of the new baby and household. Pt reflected on gratitude but also feeling overwhelmed having another individual in the home for that long. Pt reported that trip to [facility] was well and what she needed. Pt reported that over the last week she has felt an increase in depression, has minimized self care. Pt reported that she has been discouraged ans stopped breastfeeding -wasn't producing. Pt reflected on deaths of loved ones around the  holiday season that contribute to depression as well. Clinician and pt discussed utilizing support as well as activities that aid releasing mood boosting chemicals to aid with depression. Pt reflected on short term goals she would like to achieve and plans to do so.      Clinical Maneuvering/Intervention: Assisted patient in processing above session content; acknowledged and normalized patient’s thoughts, feelings, and concerns.  Rationalized patient thought process regarding concerns presented at session.  Discussed triggers associated with patient's  anxiety , depression , and PTSD Also discussed coping skills for patient to implement such as grounding , mindfulness , increasing activity , self care , and positive self talk     Allowed patient to freely discuss issues without interruption or judgment. Provided safe, confidential environment to facilitate the development of positive therapeutic relationship and encourage open, honest communication. Assisted patient in identifying risk factors which would indicate the need for higher level of care including thoughts to harm self or others and/or self-harming behavior and encouraged patient to contact this office, call 911, or present to the nearest emergency room should any of these events occur. Discussed crisis intervention services and means to access. Patient adamantly and convincingly denies current suicidal or homicidal ideation or perceptual disturbance.    Assessment: Pt was alert and oriented x3.  Pt appears open and honest re MH symptoms that have affected life in a negative way. Pt appears motivated to improve MH symptoms and overall quality of life. Pt appears aware that her depression is currently increased. Pt is aware in her behavioral changes that depression contributes to. Pt was tearful during session at times. Pt appears to struggle with relationship issues, grief, limited healthy support, discouraged from breastfeeding  Pt rated MH symptoms on a  scale 1-10 (10 being the worst). Pt rated anxiety as 4/10, depression 9/10, mood 4/10 and PTSD 1/10. Pts scores have all improved except for depression which has increased since last tx plan. Overall PTSD symptoms, anxiety and mood instability appear manageable at this time. Pt appeared receptive to techniques and encouragements discussed in session.     Patient appears to maintain relative stability as compared to their baseline.  However, patient continues to struggle with   Chief Complaint   Patient presents with    Anxiety    Depression    PTSD    which continues to cause impairment in important areas of functioning.  A result, they can be reasonably expected to continue to benefit from treatment and would likely be at increased risk for decompensation otherwise.      Mental Status Exam:   Hygiene:   fair  Cooperation:  Cooperative  Eye Contact:  Fair  Psychomotor Behavior:  Appropriate  Affect:  Appropriate  Mood: depressed  Speech:  Normal  Thought Process:  Linear  Thought Content:  Mood congruent  Suicidal:  None  Homicidal:  None  Hallucinations:  None  Delusion:  None  Memory:  Intact  Orientation:  Grossly intact  Reliability:  fair  Insight:  Fair  Judgement:  Fair  Impulse Control:  Fair  Physical/Medical Issues:  No        Patient's Support Network Includes:   limited healthy support    Functional Status: No impairment    Progress toward goal: Not at goal    Prognosis: Fair with Ongoing Treatment     Plan:     Patient will continue in individual outpatient therapy with focus on improved functioning and coping skills, maintaining stability, and avoiding decompensation and the need for higher level of care.    Patient will adhere to medication regimen as prescribed and report any side effects. Patient will contact this office, call 911 or present to the nearest emergency room should suicidal or homicidal ideations occur. Provide Cognitive Behavioral Therapy and Solution Focused Therapy to improve  functioning, maintain stability, and avoid decompensation and the need for higher level of care.     Return in about 4 weeks (around 2/10/2025).      VISIT DIAGNOSIS:    Diagnosis Plan   1. Bipolar II disorder        2. Generalized anxiety disorder        3. Post traumatic stress disorder (PTSD)         09:01 EST         This document has been electronically signed by Lay Le LCSW  January 13, 2025      Part of this note may be an electronic transcription/translation of spoken language to printed text using the Dragon Dictation System.

## 2025-01-13 NOTE — PLAN OF CARE
Discussed tx plan with patient. Tx plan was not printed due to meeting virtually. Pt was able to identify triggers relatively well. Pt has coping skills that could be implemented more in moments of increased emotions. Pt reported an understanding of how MH symptoms can affect behaviors/thought process. Pt may benefit from more frequent sessions, although due to schedule/availability this is unattainable at this time. Current life stressors include; finances, relationship partner.    Coping skills include sensory items for grounding, secluded room for pt to step away, music, podcast.

## 2025-01-13 NOTE — TREATMENT PLAN
Multi-Disciplinary Problems (from Behavioral Health Treatment Plan)      Active Problems       Problem: Anxiety  Start Date: 01/13/25      Problem Details: The patient self-scales this problem as a 4 with 10 being the worst.          Goal Priority Start Date Expected End Date End Date    Patient will develop and implement behavioral and cognitive strategies to reduce anxiety and irrational fears. Low 01/13/25 07/14/25 --    Goal Details: Progress toward goal:  The patient self-scales their progress related to this goal as a 4 with 10 being the worst.          Goal Intervention Frequency Start Date End Date    Help patient explore past emotional issues in relation to present anxiety. PRN 01/13/25 --    Intervention Details: Duration of treatment until discharged.          Goal Intervention Frequency Start Date End Date    Help patient develop an awareness of their cognitive and physical responses to anxiety. PRN 01/13/25 --    Intervention Details: Duration of treatment until discharged.     Pt is able to identify contributing factors to anxiety. Pt can become overwhelmed with situations that can increase anxiety symptoms -progressing     Aid pt in prepping for triggers of anxiety to minimize reactions to more predictable situations.                 Problem: Depression  Start Date: 01/13/25      Problem Details: The patient self-scales this problem as a 9 with 10 being the worst.          Goal Priority Start Date Expected End Date End Date    Patient will demonstrate the ability to initiate new constructive life skills outside of sessions on a consistent basis. Low 01/13/25 07/14/25 --    Goal Details: Progress toward goal:  The patient self-scales their progress related to this goal as a 9 with 10 being the worst.          Goal Intervention Frequency Start Date End Date    Assist patient in setting attainable activities of daily living goals. PRN 01/13/25 --    Intervention Details: Clinician will aid pt in  increasing structure, routine, focusing accomplishments instead of perceived failures.     Clinician will aid pt with implementing coping skills or techniques that aid with increasing natural dopamine, serotonin and endorphins.          Goal Intervention Frequency Start Date End Date    Provide education about depression PRN 01/13/25 --    Intervention Details: Duration of treatment until discharged.          Goal Intervention Frequency Start Date End Date    Assist patient in developing healthy coping strategies. PRN 01/13/25 --    Intervention Details: Duration of treatment until discharged.                  Problem: Mood Instability  Start Date: 01/13/25      Problem Details: The patient self-scales this problem as a 4 with 10 being the worst.          Goal Priority Start Date Expected End Date End Date    Patient will achieve mood stability as evidenced by controlled behavior and a more deliberate thought process Low 01/13/25 07/14/25 --    Goal Details: Progress toward goal:  The patient self-scales their progress related to this goal as a 4 with 10 being the worst.          Goal Intervention Frequency Start Date End Date    Provide structure and focus to patient's thoughts and actions by establishing plans and routine. PRN 01/13/25 --    Intervention Details: Duration of treatment until discharged.          Goal Intervention Frequency Start Date End Date    Assist patient in setting responsible goals and limits in behavior. PRN 01/13/25 --    Intervention Details: Duration of treatment until discharged.                  Problem: Post Traumatic Stress  Start Date: 01/13/25      Problem Details: The patient self-scales this problem as a 1 with 10 being the worst.          Goal Priority Start Date Expected End Date End Date    Patient will process and move through trauma in a way that improves self regard and the patients ability to function optimally in the world around them. Low 01/13/25 07/14/25 --    Goal  Details: Progress toward goal:  The patient self-scales their progress related to this goal as a 1 with 10 being the worst.          Goal Intervention Frequency Start Date End Date    Assist patient in identifying ways that trauma has negatively impacted their view of themselves and the world. PRN 01/13/25 --    Intervention Details: Duration of treatment until discharged.          Goal Intervention Frequency Start Date End Date    Process trauma in the context of the safe session environment. PRN 01/13/25 --    Intervention Details: Duration of treatment until discharged.          Goal Intervention Frequency Start Date End Date    Develop a plan of behavior changes that will reduce the stress of the trauma. PRN 01/13/25 --    Intervention Details: Duration of treatment until discharged.                          Reviewed By       Lay Le LCSW 01/13/25 0987                     I have discussed and reviewed this treatment plan with the patient.

## 2025-02-26 ENCOUNTER — TELEMEDICINE (OUTPATIENT)
Dept: PSYCHIATRY | Facility: CLINIC | Age: 35
End: 2025-02-26
Payer: MEDICAID

## 2025-02-26 DIAGNOSIS — F43.10 POST TRAUMATIC STRESS DISORDER (PTSD): ICD-10-CM

## 2025-02-26 DIAGNOSIS — F41.1 GENERALIZED ANXIETY DISORDER: ICD-10-CM

## 2025-02-26 DIAGNOSIS — F33.1 MAJOR DEPRESSIVE DISORDER, RECURRENT EPISODE, MODERATE: ICD-10-CM

## 2025-02-26 DIAGNOSIS — F31.81 BIPOLAR II DISORDER: Primary | ICD-10-CM

## 2025-02-26 NOTE — PROGRESS NOTES
Date: 2025  Time In: 08:00 EST  Time out: 8:58 AM EST    This provider is located at [facility], [facility] [place], [place], [facility] [place], using a secure [employer] Video Visit through [employer]. Patient is being seen remotely via telehealth at their home address is located in [place]. Patient stated they are in a secure environment for this session. The patient's condition being diagnosed and treated is appropriate for telemedicine. The provider identified themself as well as their credentials. The patient, or  patient's legal guardian consent to be seen remotely, and when consent is given they understand that the consent allows for patient identifiable information to be sent to a third party as needed. They may refuse to be seen remotely at any time. The electronic data is encrypted and password protected, and the patient's or  legal guardian has been advised of the potential risks to privacy not withstanding such measures.   PT Identifiers used: Name and .    You have chosen to receive care through a telehealth visit.  Do you consent to use a video/audio connection for your medical care today? Yes    Subjective   Lake Sprague is a 34 y.o. female who presents today for follow up    Chief Complaint:   Chief Complaint   Patient presents with    Anxiety    Depression    PTSD        Data: Pt reported that depression has improved some with weather changing. Pt reported that she has improved her self care which was an issue last session. Pt reported using an anabel called [employer] that has been helpful with taking care of self. Pt reported noticing having a drink a couple times a week, pt denies getting drunk -only 'winding down' with one drink. Pt and clinician discussed  ways to minimize intake -keeping alcohol out of the house to avoid forming unhealthy habits. Pt reported that she and her oldest son will be traveling to [place] to see his bio father on spring break. Pt reported she was able  to pay her debt down with her tax refund and get necessities -feeling very good about this.       Clinical Maneuvering/Intervention: Assisted patient in processing above session content; acknowledged and normalized patient’s thoughts, feelings, and concerns.  Rationalized patient thought process regarding concerns presented at session.  Discussed triggers associated with patient's  anxiety , depression , and PTSD Also discussed coping skills for patient to implement such as grounding , mindfulness , increasing activity , self care , positive self talk , and want vs need, [place] anabel, healthy coping skills    Allowed patient to freely discuss issues without interruption or judgment. Provided safe, confidential environment to facilitate the development of positive therapeutic relationship and encourage open, honest communication. Assisted patient in identifying risk factors which would indicate the need for higher level of care including thoughts to harm self or others and/or self-harming behavior and encouraged patient to contact this office, call 911, or present to the nearest emergency room should any of these events occur. Discussed crisis intervention services and means to access. Patient adamantly and convincingly denies current suicidal or homicidal ideation or perceptual disturbance.    Assessment: Pt was alert and oriented x3. Pt appears open and honest re MH symptoms that have affected life in a negative way. Pt appears motivated to improve MH symptoms and overall quality of life. Pt appears aware that drinking could possibly snowball into a habit, mindful and has an action plan to minimize this. Pt appears to have implemented some changes to aid with MH. Pt appears to have a good balance with her partner and household chores. Pt appears to have improved MH symptoms., depression symptoms have minimized. Pt appeared receptive to techniques and encouragements discussed in session.     Patient appears to maintain  relative stability as compared to their baseline.  However, patient continues to struggle with   Chief Complaint   Patient presents with    Anxiety    Depression    PTSD    which continues to cause impairment in important areas of functioning.  A result, they can be reasonably expected to continue to benefit from treatment and would likely be at increased risk for decompensation otherwise.        Mental Status Exam:   Hygiene:   fair  Cooperation:  Cooperative  Eye Contact:  Good  Psychomotor Behavior:  Appropriate  Affect:  Appropriate  Mood: depressed  Speech:  Normal  Thought Process:  Linear  Thought Content:  Mood congruent  Suicidal:  None  Homicidal:  None  Hallucinations:  None  Delusion:  None  Memory:  Intact  Orientation:  Grossly intact  Reliability:  good  Insight:  Fair  Judgement:  Fair  Impulse Control:  Poor  Physical/Medical Issues:  No        Patient's Support Network Includes:   limited healthy support    Functional Status: No impairment    Progress toward goal: Not at goal    Prognosis: Fair with Ongoing Treatment     Plan:     Patient will continue in individual outpatient therapy with focus on improved functioning and coping skills, maintaining stability, and avoiding decompensation and the need for higher level of care.    Patient will adhere to medication regimen as prescribed and report any side effects. Patient will contact this office, call 911 or present to the nearest emergency room should suicidal or homicidal ideations occur. Provide Cognitive Behavioral Therapy and Solution Focused Therapy to improve functioning, maintain stability, and avoid decompensation and the need for higher level of care.     Return in about 3 weeks (around 3/19/2025).      VISIT DIAGNOSIS:    Diagnosis Plan   1. Bipolar II disorder        2. Generalized anxiety disorder        3. Post traumatic stress disorder (PTSD)        4. Major depressive disorder, recurrent episode, moderate         08:00 EST          This document has been electronically signed by Lay Le LCSW  February 26, 2025      Part of this note may be an electronic transcription/translation of spoken language to printed text using the Dragon Dictation System.

## 2025-04-09 DIAGNOSIS — F41.9 ANXIETY AND DEPRESSION: ICD-10-CM

## 2025-04-09 DIAGNOSIS — F32.A ANXIETY AND DEPRESSION: ICD-10-CM

## 2025-04-10 ENCOUNTER — TELEMEDICINE (OUTPATIENT)
Dept: PSYCHIATRY | Facility: CLINIC | Age: 35
End: 2025-04-10
Payer: MEDICAID

## 2025-04-10 DIAGNOSIS — F31.81 BIPOLAR II DISORDER: Primary | ICD-10-CM

## 2025-04-10 DIAGNOSIS — F43.10 POST TRAUMATIC STRESS DISORDER (PTSD): ICD-10-CM

## 2025-04-10 DIAGNOSIS — F41.1 GENERALIZED ANXIETY DISORDER: ICD-10-CM

## 2025-04-10 RX ORDER — FLUTICASONE PROPIONATE 110 UG/1
1 AEROSOL, METERED RESPIRATORY (INHALATION)
Qty: 12 G | Refills: 11 | OUTPATIENT
Start: 2025-04-10

## 2025-04-10 RX ORDER — ACETAMINOPHEN 500 MG
500 TABLET ORAL EVERY 6 HOURS PRN
Qty: 90 TABLET | Refills: 3 | Status: SHIPPED | OUTPATIENT
Start: 2025-04-10

## 2025-04-10 RX ORDER — IBUPROFEN 800 MG/1
800 TABLET, FILM COATED ORAL EVERY 8 HOURS PRN
Qty: 40 TABLET | Refills: 1 | OUTPATIENT
Start: 2025-04-10

## 2025-04-10 RX ORDER — ALBUTEROL SULFATE 90 UG/1
2 INHALANT RESPIRATORY (INHALATION) EVERY 4 HOURS PRN
Qty: 18 G | Refills: 2 | Status: SHIPPED | OUTPATIENT
Start: 2025-04-10

## 2025-04-10 NOTE — TELEPHONE ENCOUNTER
Rx Refill Note  Requested Prescriptions     Pending Prescriptions Disp Refills    acetaminophen (TYLENOL) 500 MG tablet       Sig: Take 1 tablet by mouth Every 6 (Six) Hours As Needed for Mild Pain.     Signed Prescriptions Disp Refills    albuterol sulfate  (90 Base) MCG/ACT inhaler 18 g 2     Sig: Inhale 2 puffs Every 4 (Four) Hours As Needed for Wheezing.     Authorizing Provider: CHRISTY GREGORY     Ordering User: YAEL NIELSEN      Last office visit with prescribing clinician: 10/25/2024   Last telemedicine visit with prescribing clinician: Visit date not found   Next office visit with prescribing clinician: Visit date not found                         Would you like a call back once the refill request has been completed: [] Yes [] No    If the office needs to give you a call back, can they leave a voicemail: [] Yes [] No    Yael Nielsen MA  04/10/25, 07:11 EDT

## 2025-04-10 NOTE — PROGRESS NOTES
Date: April 10, 2025  Time In: 07:59 EDT  Time out: 8:54 AM EST    This provider is located at [facility], [address] [place] [address], [facility], using a secure [employer] Video Visit through [employer]. Patient is being seen remotely via telehealth at their home address is located in [place]. Patient stated they are in a secure environment for this session. The patient's condition being diagnosed and treated is appropriate for telemedicine. The provider identified themself as well as their credentials. The patient, or  patient's legal guardian consent to be seen remotely, and when consent is given they understand that the consent allows for patient identifiable information to be sent to a third party as needed. They may refuse to be seen remotely at any time. The electronic data is encrypted and password protected, and the patient's or  legal guardian has been advised of the potential risks to privacy not withstanding such measures.   PT Identifiers used: Name and .    You have chosen to receive care through a telehealth visit.  Do you consent to use a video/audio connection for your medical care today? Yes    Subjective   Lake Sprague is a 34 y.o. female who presents today for follow up    Chief Complaint:   Chief Complaint   Patient presents with    Anxiety    Depression        Data: Pt reported that she has been struggling. Pt reported that she has been overwhelmed with work/life balance. Pt reported passive SI with no plan or intent at this time. Pt reported that she feels like everyone relies on her and no one is there for her when she needs them. Pt reported that she doesn't have support in [place] that she can lean on. Pt reflected on issues in her relationship, unhealthy communication. Pt was informed of resources that may be beneficial for her in session.      Clinical Maneuvering/Intervention: Assisted patient in processing above session content; acknowledged and normalized patient’s  thoughts, feelings, and concerns.  Rationalized patient thought process regarding concerns presented at session.  Discussed triggers associated with patient's  anxiety , depression , and PTSD Also discussed coping skills for patient to implement such as grounding , mindfulness , self care , positive self talk , and asking for aid    Allowed patient to freely discuss issues without interruption or judgment. Provided safe, confidential environment to facilitate the development of positive therapeutic relationship and encourage open, honest communication. Assisted patient in identifying risk factors which would indicate the need for higher level of care including thoughts to harm self or others and/or self-harming behavior and encouraged patient to contact this office, call 911, or present to the nearest emergency room should any of these events occur. Discussed crisis intervention services and means to access. Patient adamantly and convincingly denies current suicidal or homicidal ideation or perceptual disturbance.    Assessment: Pt was alert and oriented x3.  Pt appears open and honest re MH symptoms that have affected life in a negative way. Pt appears motivated to improve MH symptoms and overall quality of life. Pt appears overwhelmed with her current situation and limited support. Pt appears to have resentment towards her partner. Pt appears to have passive SI at this time, denies plan/intent. Pts relationship appears to contribute to anxiety and depression symptoms. Unalterable demographics and a history of mental health intervention indicate this patient is in a high-risk category compared to the general population. Depression and anxiety symptoms appear increased compared to last session. Pt appeared receptive to techniques and encouragements discussed in session.     Patient appears to maintain relative stability as compared to their baseline.  However, patient continues to struggle with   Chief Complaint    Patient presents with    Anxiety    Depression    which continues to cause impairment in important areas of functioning.  A result, they can be reasonably expected to continue to benefit from treatment and would likely be at increased risk for decompensation otherwise.      Mental Status Exam:   Hygiene:   fair  Cooperation:  Cooperative  Eye Contact:  Fair  Psychomotor Behavior:  Appropriate  Affect:  Appropriate  Mood: depressed  Speech:  Normal  Thought Process:  Linear  Thought Content:  Mood congruent  Suicidal:   passive  Homicidal:  None  Hallucinations:  None  Delusion:  None  Memory:  Intact  Orientation:  Person, Place, and Time  Reliability:  fair  Insight:  Fair  Judgement:  Fair  Impulse Control:  Fair  Physical/Medical Issues:  No        Patient's Support Network Includes:   limited healthy support    Functional Status: Mild impairment     Progress toward goal: Not at goal    Prognosis: Fair with Ongoing Treatment     Plan:     Patient will continue in individual outpatient therapy with focus on improved functioning and coping skills, maintaining stability, and avoiding decompensation and the need for higher level of care.    Patient will adhere to medication regimen as prescribed and report any side effects. Patient will contact this office, call 911 or present to the nearest emergency room should suicidal or homicidal ideations occur. Provide Cognitive Behavioral Therapy and Solution Focused Therapy to improve functioning, maintain stability, and avoid decompensation and the need for higher level of care.     Return in about 3 weeks (around 5/1/2025).      VISIT DIAGNOSIS:    Diagnosis Plan   1. Bipolar II disorder        2. Generalized anxiety disorder        3. Post traumatic stress disorder (PTSD)         07:59 EDT         This document has been electronically signed by Lay Le LCSW  April 10, 2025      Part of this note may be an electronic transcription/translation of spoken  language to printed text using the Dragon Dictation System.

## 2025-04-10 NOTE — TELEPHONE ENCOUNTER
She should see her PCP for refills of these medications. It is also recommended that she have labs to check her kidney and liver function if she is taking ibuprofen regularly

## 2025-05-20 ENCOUNTER — HOSPITAL ENCOUNTER (EMERGENCY)
Facility: HOSPITAL | Age: 35
Discharge: HOME | End: 2025-05-20
Attending: EMERGENCY MEDICINE | Admitting: EMERGENCY MEDICINE
Payer: MEDICAID

## 2025-05-20 ENCOUNTER — APPOINTMENT (OUTPATIENT)
Dept: CT IMAGING | Facility: HOSPITAL | Age: 35
End: 2025-05-20
Payer: MEDICAID

## 2025-05-20 VITALS
TEMPERATURE: 97.1 F | HEART RATE: 73 BPM | RESPIRATION RATE: 18 BRPM | DIASTOLIC BLOOD PRESSURE: 69 MMHG | OXYGEN SATURATION: 96 % | SYSTOLIC BLOOD PRESSURE: 112 MMHG

## 2025-05-20 DIAGNOSIS — R10.84 GENERALIZED ABDOMINAL PAIN: Primary | ICD-10-CM

## 2025-05-20 LAB
ALBUMIN SERPL-MCNC: 4.1 G/DL (ref 3.5–5.2)
ALBUMIN/GLOB SERPL: 1.2 G/DL
ALP SERPL-CCNC: 73 U/L (ref 39–117)
ALT SERPL W P-5'-P-CCNC: 15 U/L (ref 1–33)
ANION GAP SERPL CALCULATED.3IONS-SCNC: 11 MMOL/L (ref 5–15)
AST SERPL-CCNC: 16 U/L (ref 1–32)
BASOPHILS # BLD AUTO: 0.06 10*3/MM3 (ref 0–0.2)
BASOPHILS NFR BLD AUTO: 0.6 % (ref 0–1.5)
BILIRUB SERPL-MCNC: 0.4 MG/DL (ref 0–1.2)
BILIRUB UR QL STRIP: NEGATIVE
BUN SERPL-MCNC: 9 MG/DL (ref 6–20)
BUN/CREAT SERPL: 14.3 (ref 7–25)
CALCIUM SPEC-SCNC: 9.3 MG/DL (ref 8.6–10.5)
CHLORIDE SERPL-SCNC: 106 MMOL/L (ref 98–107)
CLARITY UR: CLEAR
CO2 SERPL-SCNC: 21 MMOL/L (ref 22–29)
COLOR UR: YELLOW
CREAT SERPL-MCNC: 0.63 MG/DL (ref 0.57–1)
DEPRECATED RDW RBC AUTO: 41.8 FL (ref 37–54)
EGFRCR SERPLBLD CKD-EPI 2021: 121 ML/MIN/1.73
EOSINOPHIL # BLD AUTO: 0.76 10*3/MM3 (ref 0–0.4)
EOSINOPHIL NFR BLD AUTO: 8.1 % (ref 0.3–6.2)
ERYTHROCYTE [DISTWIDTH] IN BLOOD BY AUTOMATED COUNT: 13.4 % (ref 12.3–15.4)
GLOBULIN UR ELPH-MCNC: 3.4 GM/DL
GLUCOSE SERPL-MCNC: 110 MG/DL (ref 65–99)
GLUCOSE UR STRIP-MCNC: NEGATIVE MG/DL
HCG SERPL QL: NEGATIVE
HCT VFR BLD AUTO: 40.5 % (ref 34–46.6)
HGB BLD-MCNC: 13.4 G/DL (ref 12–15.9)
HGB UR QL STRIP.AUTO: NEGATIVE
IMM GRANULOCYTES # BLD AUTO: 0.03 10*3/MM3 (ref 0–0.05)
IMM GRANULOCYTES NFR BLD AUTO: 0.3 % (ref 0–0.5)
INR PPP: 1.1 (ref 0.9–1.1)
KETONES UR QL STRIP: NEGATIVE
LEUKOCYTE ESTERASE UR QL STRIP.AUTO: NEGATIVE
LIPASE SERPL-CCNC: 23 U/L (ref 13–60)
LYMPHOCYTES # BLD AUTO: 2.52 10*3/MM3 (ref 0.7–3.1)
LYMPHOCYTES NFR BLD AUTO: 26.8 % (ref 19.6–45.3)
MAGNESIUM SERPL-MCNC: 2 MG/DL (ref 1.6–2.6)
MCH RBC QN AUTO: 28.7 PG (ref 26.6–33)
MCHC RBC AUTO-ENTMCNC: 33.1 G/DL (ref 31.5–35.7)
MCV RBC AUTO: 86.7 FL (ref 79–97)
MONOCYTES # BLD AUTO: 0.35 10*3/MM3 (ref 0.1–0.9)
MONOCYTES NFR BLD AUTO: 3.7 % (ref 5–12)
NEUTROPHILS NFR BLD AUTO: 5.69 10*3/MM3 (ref 1.7–7)
NEUTROPHILS NFR BLD AUTO: 60.5 % (ref 42.7–76)
NITRITE UR QL STRIP: NEGATIVE
NRBC BLD AUTO-RTO: 0 /100 WBC (ref 0–0.2)
PH UR STRIP.AUTO: 6.5 [PH] (ref 5–8)
PLATELET # BLD AUTO: 336 10*3/MM3 (ref 140–450)
PMV BLD AUTO: 10.2 FL (ref 6–12)
POTASSIUM SERPL-SCNC: 3.7 MMOL/L (ref 3.5–5.2)
PROCALCITONIN SERPL-MCNC: <0.02 NG/ML (ref 0–0.25)
PROT SERPL-MCNC: 7.5 G/DL (ref 6–8.5)
PROT UR QL STRIP: NEGATIVE
PROTHROMBIN TIME: 14.1 SECONDS (ref 11.7–14.2)
RBC # BLD AUTO: 4.67 10*6/MM3 (ref 3.77–5.28)
SODIUM SERPL-SCNC: 138 MMOL/L (ref 136–145)
SP GR UR STRIP: >1.03 (ref 1–1.03)
UROBILINOGEN UR QL STRIP: ABNORMAL
WBC NRBC COR # BLD AUTO: 9.41 10*3/MM3 (ref 3.4–10.8)

## 2025-05-20 PROCEDURE — 99285 EMERGENCY DEPT VISIT HI MDM: CPT

## 2025-05-20 PROCEDURE — 81003 URINALYSIS AUTO W/O SCOPE: CPT | Performed by: EMERGENCY MEDICINE

## 2025-05-20 PROCEDURE — 84703 CHORIONIC GONADOTROPIN ASSAY: CPT | Performed by: EMERGENCY MEDICINE

## 2025-05-20 PROCEDURE — 84145 PROCALCITONIN (PCT): CPT | Performed by: EMERGENCY MEDICINE

## 2025-05-20 PROCEDURE — 96374 THER/PROPH/DIAG INJ IV PUSH: CPT

## 2025-05-20 PROCEDURE — 80053 COMPREHEN METABOLIC PANEL: CPT | Performed by: EMERGENCY MEDICINE

## 2025-05-20 PROCEDURE — 85025 COMPLETE CBC W/AUTO DIFF WBC: CPT | Performed by: EMERGENCY MEDICINE

## 2025-05-20 PROCEDURE — 25510000001 IOPAMIDOL 61 % SOLUTION: Performed by: EMERGENCY MEDICINE

## 2025-05-20 PROCEDURE — 83735 ASSAY OF MAGNESIUM: CPT | Performed by: EMERGENCY MEDICINE

## 2025-05-20 PROCEDURE — 25810000003 SODIUM CHLORIDE 0.9 % SOLUTION: Performed by: EMERGENCY MEDICINE

## 2025-05-20 PROCEDURE — 96375 TX/PRO/DX INJ NEW DRUG ADDON: CPT

## 2025-05-20 PROCEDURE — 74177 CT ABD & PELVIS W/CONTRAST: CPT

## 2025-05-20 PROCEDURE — 83690 ASSAY OF LIPASE: CPT | Performed by: EMERGENCY MEDICINE

## 2025-05-20 PROCEDURE — 36415 COLL VENOUS BLD VENIPUNCTURE: CPT

## 2025-05-20 PROCEDURE — 25010000002 DIPHENHYDRAMINE PER 50 MG: Performed by: EMERGENCY MEDICINE

## 2025-05-20 PROCEDURE — 85610 PROTHROMBIN TIME: CPT | Performed by: EMERGENCY MEDICINE

## 2025-05-20 PROCEDURE — 25010000002 DROPERIDOL PER 5 MG: Performed by: EMERGENCY MEDICINE

## 2025-05-20 RX ORDER — DROPERIDOL 2.5 MG/ML
1.25 INJECTION, SOLUTION INTRAMUSCULAR; INTRAVENOUS ONCE
Status: COMPLETED | OUTPATIENT
Start: 2025-05-20 | End: 2025-05-20

## 2025-05-20 RX ORDER — SODIUM CHLORIDE 0.9 % (FLUSH) 0.9 %
10 SYRINGE (ML) INJECTION AS NEEDED
Status: DISCONTINUED | OUTPATIENT
Start: 2025-05-20 | End: 2025-05-20 | Stop reason: HOSPADM

## 2025-05-20 RX ORDER — DIPHENHYDRAMINE HYDROCHLORIDE 50 MG/ML
25 INJECTION, SOLUTION INTRAMUSCULAR; INTRAVENOUS ONCE
Status: COMPLETED | OUTPATIENT
Start: 2025-05-20 | End: 2025-05-20

## 2025-05-20 RX ORDER — IOPAMIDOL 612 MG/ML
100 INJECTION, SOLUTION INTRAVASCULAR
Status: COMPLETED | OUTPATIENT
Start: 2025-05-20 | End: 2025-05-20

## 2025-05-20 RX ADMIN — DROPERIDOL 1.25 MG: 2.5 INJECTION, SOLUTION INTRAMUSCULAR; INTRAVENOUS at 09:47

## 2025-05-20 RX ADMIN — DIPHENHYDRAMINE HYDROCHLORIDE 25 MG: 50 INJECTION, SOLUTION INTRAMUSCULAR; INTRAVENOUS at 09:45

## 2025-05-20 RX ADMIN — SODIUM CHLORIDE 1000 ML: 9 INJECTION, SOLUTION INTRAVENOUS at 09:43

## 2025-05-20 RX ADMIN — IOPAMIDOL 85 ML: 612 INJECTION, SOLUTION INTRAVENOUS at 10:00

## 2025-05-20 NOTE — ED PROVIDER NOTES
EMERGENCY DEPARTMENT ENCOUNTER    Room Number:    Date of encounter:  2025  PCP: Nydia Vasquez MD  Historian: Patient  Relevant information and history provided by sources other than the patient will be included below and in the ED Course.  Review of pertinent past medical records may also be included in record below and ED Course.    HPI:  Chief Complaint: Abdominal pain  A complete HPI/ROS/PMH/PSH/SH/FH are unobtainable due to: Not applicable  Context: Lake Sprague is a 34 y.o. female who presents to the ED c/o this episode of pain started on , May 18 in the afternoon.  It is persistent.  Is been constant and might wax and wane in severity.  Currently right now it is severe.  She has had associated nausea with it she is occasionally had a little bit of spit up.  She has had no diarrhea.  She almost feels as if the pain is an ache maybe a sq in the evening.  Denies any recent alcohol consumption.  It radiates to the lower abdomen.  Currently it is not radiating to the lower abdomen.  Denies chest pain denies shortness of breath.  She was seen at [facility] emergency department had a workup and was told everything was okay and prescribed an antidiarrheal.  She has never had any diarrhea.  She reports no fevers or chills or coughs or colds.  Denies any urinary symptoms.  Her only surgery she had was in August where she had a .  Since 2024 after delivery she smokes marijuana on a daily basis.  She smokes in the morning as well as in the evening.  Denies any recent alcohol consumption.  About 1-1/2 years ago had a similar presentation presented to [facility] emergency department had a workup including CT scan that was negative.  After several days the symptoms resolved.  She did follow-up with a GI doctor.  She does not know who the gastroenterologist she followed up with.  No further testing was performed.        Previous Episodes: Yes 1 and half years ago had similar  presenting symptoms.  At that time she was smoking marijuana as well.  During her pregnancy she did stop smoking marijuana  Current Symptoms: Complaining of upper abdominal pain with nausea    MEDICAL HISTORY REVIEWED  I can see this patient was seen at [facility] 2025 she came in with abdominal pain.  I did review her lab work and urinalysis.  Urinalysis looked unremarkable there was a little bit of blood in it no definitive infection seen.  Chemistries reviewed which essentially was unremarkable as well.  White blood count was 13,000 with a normal hemoglobin.  Patient did have a CT scan of the abdomen pelvis which showed no acute findings.  I did review the report in epic.    I can see this patient has a history of anxiety and depression.  History of bipolar disease and PTSD.    PAST MEDICAL HISTORY  Active Ambulatory Problems     Diagnosis Date Noted    Asthma 2024    Anxiety and depression 2024    Bipolar 2 disorder 2024    Posttraumatic stress disorder 2024    Plantar fasciitis 2024    Body mass index (BMI) 40.0-44.9, adult 2024    Ganglion cyst of volar aspect of left wrist 10/25/2024     Resolved Ambulatory Problems     Diagnosis Date Noted    Previous  section 2024    History of IUFD 2024    Marijuana use during pregnancy 2024    Encounter for health maintenance examination in adult 2024    Subchorionic hemorrhage of placenta, antepartum 2024    Obesity in pregnancy, antepartum 2024    Maternal anemia in pregnancy, antepartum 2024    GBS (group B Streptococcus carrier), +RV culture, currently pregnant 2024     Past Medical History:   Diagnosis Date    Bipolar disorder     PTSD (post-traumatic stress disorder)          PAST SURGICAL HISTORY  Past Surgical History:   Procedure Laterality Date     SECTION       SECTION N/A 2024    Procedure:  SECTION REPEAT;  Surgeon: Dennis Mckeon MD;   Location: [facility] LABOR DELIVERY;  Service: Obstetrics/Gynecology;  Laterality: N/A;  patient declined tubal at time of surgery    WISDOM TOOTH EXTRACTION      WRIST SURGERY Left     x2 surgeries: tumor removal and scar repair         FAMILY HISTORY  Family History   Problem Relation Age of Onset    Bipolar disorder Mother     Post-traumatic stress disorder Mother     Thyroid disease Mother     Diabetes Father     Stroke Father     Hypertension Maternal Grandmother     Stroke Maternal Grandfather          SOCIAL HISTORY  Social History     Socioeconomic History    Marital status: Single   Tobacco Use    Smoking status: Former     Current packs/day: 0.25     Average packs/day: 0.3 packs/day for 6.4 years (1.6 ttl pk-yrs)     Types: Cigarettes     Start date: 2019     Passive exposure: Past    Smokeless tobacco: Never    Tobacco comments:     2019    Vaping Use    Vaping status: Never Used   Substance and Sexual Activity    Alcohol use: Not Currently    Drug use: Not Currently     Types: Cocaine(coke), Marijuana, Hydrocodone     Comment: cocaine for 2 year 2020 , MJ quit  with preg, oxycodone 16 yrs ago    Sexual activity: Yes     Partners: Male     Comment: MURALI : 9/17/2024         ALLERGIES  Patient has no known allergies.        REVIEW OF SYSTEMS  Review of Systems     All systems reviewed and negative except for those discussed in HPI.       PHYSICAL EXAM    I have reviewed the triage vital signs and nursing notes.    ED Triage Vitals   Temp Heart Rate Resp BP SpO2   05/20/25 0858 05/20/25 0858 05/20/25 0858 05/20/25 0900 05/20/25 0858   97.1 °F (36.2 °C) 99 18 149/97 97 %      Temp src Heart Rate Source Patient Position BP Location FiO2 (%)   05/20/25 0858 05/20/25 0858 05/20/25 0900 05/20/25 0900 --   Tympanic Monitor Sitting Left arm        GENERAL: Anxious young female.  Looks like she is in some discomfort.  Holding her upper portion of her abdomen. .Vital signs on my initial evaluation have been reviewed.  She  is hypertensive.  She is afebrile normal heart rate normal oxygen saturation  HENT: nares patent  Head/neck/ face are symmetric without gross deformity, signs of trauma, or swelling  EYES: no scleral icterus, no conjunctival pallor.  NECK: Supple, no meningismus  CV: regular rhythm, regular rate with intact distal pulses.  RESPIRATORY: normal effort and no respiratory distress.  To auscultation bilaterally  ABDOMEN: soft and tenderness her upper abdomen.  In the midepigastric as well as right upper quadrant left upper quadrant region.  Patient is morbidly obese.  Normal bowel sounds  MUSCULOSKELETAL: no deformity.  Intact distal pulses that are equal strong symmetric  NEURO: alert and appropriate, moves all extremities, follows commands.  No focal motor or sensory changes  SKIN: warm, dry    Vital signs and nursing notes reviewed.        LAB RESULTS  Recent Results (from the past 24 hours)   Comprehensive Metabolic Panel    Collection Time: 05/20/25  9:19 AM    Specimen: Blood   Result Value Ref Range    Glucose 110 (H) 65 - 99 mg/dL    BUN 9 6 - 20 mg/dL    Creatinine 0.63 0.57 - 1.00 mg/dL    Sodium 138 136 - 145 mmol/L    Potassium 3.7 3.5 - 5.2 mmol/L    Chloride 106 98 - 107 mmol/L    CO2 21.0 (L) 22.0 - 29.0 mmol/L    Calcium 9.3 8.6 - 10.5 mg/dL    Total Protein 7.5 6.0 - 8.5 g/dL    Albumin 4.1 3.5 - 5.2 g/dL    ALT (SGPT) 15 1 - 33 U/L    AST (SGOT) 16 1 - 32 U/L    Alkaline Phosphatase 73 39 - 117 U/L    Total Bilirubin 0.4 0.0 - 1.2 mg/dL    Globulin 3.4 gm/dL    A/G Ratio 1.2 g/dL    BUN/Creatinine Ratio 14.3 7.0 - 25.0    Anion Gap 11.0 5.0 - 15.0 mmol/L    eGFR 121.0 >60.0 mL/min/1.73   Protime-INR    Collection Time: 05/20/25  9:19 AM    Specimen: Blood   Result Value Ref Range    Protime 14.1 11.7 - 14.2 Seconds    INR 1.10 0.90 - 1.10   Lipase    Collection Time: 05/20/25  9:19 AM    Specimen: Blood   Result Value Ref Range    Lipase 23 13 - 60 U/L   hCG, Serum, Qualitative    Collection Time:  05/20/25  9:19 AM    Specimen: Blood   Result Value Ref Range    HCG Qualitative Negative Negative   Magnesium    Collection Time: 05/20/25  9:19 AM    Specimen: Blood   Result Value Ref Range    Magnesium 2.0 1.6 - 2.6 mg/dL   CBC Auto Differential    Collection Time: 05/20/25  9:19 AM    Specimen: Blood   Result Value Ref Range    WBC 9.41 3.40 - 10.80 10*3/mm3    RBC 4.67 3.77 - 5.28 10*6/mm3    Hemoglobin 13.4 12.0 - 15.9 g/dL    Hematocrit 40.5 34.0 - 46.6 %    MCV 86.7 79.0 - 97.0 fL    MCH 28.7 26.6 - 33.0 pg    MCHC 33.1 31.5 - 35.7 g/dL    RDW 13.4 12.3 - 15.4 %    RDW-SD 41.8 37.0 - 54.0 fl    MPV 10.2 6.0 - 12.0 fL    Platelets 336 140 - 450 10*3/mm3    Neutrophil % 60.5 42.7 - 76.0 %    Lymphocyte % 26.8 19.6 - 45.3 %    Monocyte % 3.7 (L) 5.0 - 12.0 %    Eosinophil % 8.1 (H) 0.3 - 6.2 %    Basophil % 0.6 0.0 - 1.5 %    Immature Grans % 0.3 0.0 - 0.5 %    Neutrophils, Absolute 5.69 1.70 - 7.00 10*3/mm3    Lymphocytes, Absolute 2.52 0.70 - 3.10 10*3/mm3    Monocytes, Absolute 0.35 0.10 - 0.90 10*3/mm3    Eosinophils, Absolute 0.76 (H) 0.00 - 0.40 10*3/mm3    Basophils, Absolute 0.06 0.00 - 0.20 10*3/mm3    Immature Grans, Absolute 0.03 0.00 - 0.05 10*3/mm3    nRBC 0.0 0.0 - 0.2 /100 WBC   Procalcitonin    Collection Time: 05/20/25  9:19 AM    Specimen: Blood   Result Value Ref Range    Procalcitonin <0.02 0.00 - 0.25 ng/mL   Urinalysis With Microscopic If Indicated (No Culture) - Urine, Clean Catch    Collection Time: 05/20/25 12:00 PM    Specimen: Urine, Clean Catch   Result Value Ref Range    Color, UA Yellow Yellow, Straw    Appearance, UA Clear Clear    pH, UA 6.5 5.0 - 8.0    Specific Gravity, UA >1.030 (H) 1.005 - 1.030    Glucose, UA Negative Negative    Ketones, UA Negative Negative    Bilirubin, UA Negative Negative    Blood, UA Negative Negative    Protein, UA Negative Negative    Leuk Esterase, UA Negative Negative    Nitrite, UA Negative Negative    Urobilinogen, UA 1.0 E.U./dL 0.2 - 1.0  E.U./dL       Ordered the above labs and independently reviewed the results.        RADIOLOGY  CT Abdomen Pelvis With Contrast  Result Date: 5/20/2025  CT ABDOMEN AND PELVIS WITH CONTRAST  DATE OF EXAM: 05/20/2025 9:51 AM  INDICATION: Upper abdominal pain.  There is some radiation to the lower abdomen.  With nausea.  COMPARISON: CT chest 10/09/2024.  TECHNIQUE: Multiple contiguous axial images were acquired through the abdomen and pelvis following the intravenous administration of 85 mL of Isovue-300.  Reformatted coronal and sagittal sequences were also reviewed. Radiation dose reduction techniques were utilized, including automated exposure control and exposure modulation based on body size.  FINDINGS: Respiratory motion artifact mildly limits evaluation of the lung bases. Stable tiny likely benign sub-4 mm subpleural nodule in the posterior left lower lobe. The included lung bases are otherwise clear.  The liver, gallbladder, spleen, pancreas, adrenal glands, and kidneys are unremarkable. The urinary bladder, uterus, and adnexa are unremarkable in CT appearance.  The stomach is moderately distended with ingested contents and air. Mild colorectal stool. Mild diffuse circumferential low-attenuation thickening of the wall of the colon can be seen with chronic inflammatory bowel disease. No bowel obstruction. The appendix is normal.  Trace free fluid in the pelvis could be physiologic. No free intraperitoneal air. No pathologically enlarged lymph nodes in the abdomen or pelvis. Tiny fat-containing umbilical hernia. No acute osseous abnormality or concerning osseous lesion.       1. Mild diffuse circumferential low-attenuation thickening of the wall of the colon, which can be seen with chronic inflammatory bowel disease. 2. Otherwise, no acute abnormality in the abdomen or pelvis.  This report was finalized on 5/20/2025 11:27 AM by Ab Hermosillo MD on Workstation: [id number]        I ordered the above noted  radiological studies. Reviewed by me and discussed with radiologist.  See dictation for official radiology interpretation.      PROCEDURES    Procedures      MEDICATIONS GIVEN IN ER    Medications   droperidol (INAPSINE) injection 1.25 mg (1.25 mg Intravenous Given 5/20/25 0947)   diphenhydrAMINE (BENADRYL) injection 25 mg (25 mg Intravenous Given 5/20/25 0945)   sodium chloride 0.9 % bolus 1,000 mL (0 mL Intravenous Stopped 5/20/25 1340)   iopamidol (ISOVUE-300) 61 % injection 100 mL (85 mL Intravenous Given 5/20/25 1000)         All labs have been independently reviewed by me.  All radiology studies have been reviewed by me and I discussed with radiologist dictating the report when indicated below.  All EKG's independently viewed and interpreted by me.  Discussion below represents my analysis of pertinent findings related to patient's condition, differential diagnosis, treatment plan and final disposition.        PROGRESS, DATA ANALYSIS, CONSULTS, AND MEDICAL DECISION MAKING    Differential diagnosis includes   - hepatobiliary pathology such as cholecystitis, cholangitis, and symptomatic cholelithiasis  -PUD  -Mesenteric ischemia  - Pancreatitis  - Dyspepsia  - Small bowel or large bowel obstruction  - Appendicitis  - Diverticulitis  - UTI including pyelonephritis  - Ureteral stone  - Zoster  - Colitis, including infectious and ischemic  - Atypical ACS  Recent workup at [facility] including CT scan was unremarkable.  Her white blood cell count was elevated at 13.  I am going to go ahead and give her some droperidol and some Benadryl and IV fluids.  She does smoke marijuana on a daily basis.  Very possibly could be a contributing factor of her symptoms.  Informed her of my clinical concerns at the test that we will order.  All questions answered at this time.      ED Course as of 05/20/25 1554   Tue May 20, 2025   1136 Procalcitonin: <0.02 [MM]   1136 WBC: 9.41 [MM]   1137 Patient has mild diffuse circumferential  low-attenuation thickening of the wall of the colon which can be seen with chronic inflammatory bowel disease.  Otherwise no acute abnormality seen in the CT scan of the abdomen pelvis.  Please see complete dictated report from radiologist [MM]   1217 Nitrite, UA: Negative [MM]   1217 Leukocytes, UA: Negative [MM]   1217 Blood, UA: Negative [MM]   1319 I reevaluated this patient a couple times.  After I initially reevaluated the patient I offered admission for her and she wanted to think about it.  I went back and talk with her again which is right now.  She wants to go home.  She is feeling much better.  Her pain is essentially resolved.  I informed her I do not know the exact etiology of her symptoms of abdominal pain.  I encouraged her to stop smoking marijuana as it potentially could be a contributing factor.  Informed her about the results of the CT scan and the fact that I am going to refer her to a gastroenterologist.  Informed the patient if she has any recurrent episodes of her symptoms any bleeding, not able tolerate liquids or solids, fevers or chills to return to the emergency department.  Patient agrees with that plan.  Again patient does not want to be admitted to the hospital or observation unit at this time.  She refused my admission to the observation and for GI consult and further evaluation. [MM]   1322 I informed the patient the results of the test.  The patient's repeat exam, test results, and history are not concerning for a serious etiology of their symptoms. I explained  to the patient the initial treatment plan and  I instructed  the patient to return to the emergency department if fever develops, worsening of pain, not able to tolerate liquids, or worsening of symptoms.  I instructed the patient to follow up with their physician for further evaluation in 2-3 days.  The patient understands and agrees with the treatment plan.     [MM]      ED Course User Index  [MM] Conrado Braden MD        AS OF 15:54 EDT VITALS:    BP - 112/69  HR - 73  TEMP - 97.1 °F (36.2 °C) (Tympanic)  02 SATS - 96%    SOCIAL DETERMINANTS OF HEALTH THAT IMPACT OR LIMIT CARE (For example..Homelessness,safe discharge, inability to obtain care, follow up, or prescriptions):      DIAGNOSIS  Final diagnoses:   Generalized abdominal pain         DISPOSITION  DISCHARGE    Patient discharged in stable condition.    Reviewed implications of results, diagnosis, meds, responsibility to follow up, warning signs and symptoms of possible worsening, potential complications and reasons to return to ER, including worsening of symptoms, return of pain, fever, bleeding, not able tolerate liquids or solids, or any other concerns..    Patient/Family voiced understanding of above instructions.    Discussed plan for discharge, as there is no emergent indication for admission. Pt/family is agreeable and understands need for follow up and repeat testing.  Pt is aware that discharge does not mean that nothing is wrong but it indicates no emergency is present that requires admission and they must continue care with follow-up as given below or physician of their choice.     FOLLOW-UP  Nydia Vasquez MD  [address] [address]  [phone]    In 3 days  Return if pain worsens, shortness of breath, fever, any bleeding, any concerns.         Medication List      No changes were made to your prescriptions during this visit.                 DICTATED UTILIZING DRAGON DICTATION    Note Disclaimer: At [facility], we believe that sharing information builds trust and better relationships. You are receiving this note because you recently visited [facility]. It is possible you will see health information before a provider has talked with you about it. This kind of information can be easy to misunderstand. To help you fully understand what it means for your health, we urge you to discuss this note with your provider.        Conrado Braden MD  05/20/25 2743

## 2025-05-20 NOTE — DISCHARGE INSTRUCTIONS
As we discussed, stop smoking marijuana.  Start with clear liquids and advance diet as tolerated.  Return if you have any return of pain, fever, not able to tolerate liquids or solids, bleeding, any concerns.

## 2025-05-20 NOTE — Clinical Note
[facility] EMERGENCY DEPARTMENT  [address] [facility] [address]  Phone: [phone]    Lake Sprague was seen and treated in our emergency department on 5/20/2025.  She may return to work on 05/21/2025.         Thank you for choosing [facility].    Conrado Braden MD

## 2025-05-20 NOTE — ED NOTES
Pt to ed from home via PV    Pt c/o lower abd pain since Sunday. Pt was seen at [facility] on Sunday for same thing and had negative CT. Pt denies vomiting and diarrhea.

## 2025-05-22 ENCOUNTER — TELEMEDICINE (OUTPATIENT)
Dept: PSYCHIATRY | Facility: CLINIC | Age: 35
End: 2025-05-22
Payer: MEDICAID

## 2025-05-22 DIAGNOSIS — F43.10 POST TRAUMATIC STRESS DISORDER (PTSD): ICD-10-CM

## 2025-05-22 DIAGNOSIS — F33.1 MAJOR DEPRESSIVE DISORDER, RECURRENT EPISODE, MODERATE: ICD-10-CM

## 2025-05-22 DIAGNOSIS — F41.1 GENERALIZED ANXIETY DISORDER: ICD-10-CM

## 2025-05-22 DIAGNOSIS — F31.81 BIPOLAR II DISORDER: Primary | ICD-10-CM

## 2025-05-22 NOTE — PLAN OF CARE
Discussed tx plan with patient. Tx plan was not printed due to meeting virtually. Pt was able to identify triggers relatively well. Pt has coping skills that could be implemented more in moments of increased emotions. Pt reported an understanding of how MH symptoms can affect behaviors/thought process. Pt has been inconsistent with scheduling and making appointments, difficult to keep on a consistent schedule. Pt may benefit from more frequent sessions, although due to schedule/availability this is unattainable at this time. Current life stressors include; finances, physical health, relationship, employment, big experiences, time management.    Coping skills, hobbies and techniques: walks, breathing, supports, games

## 2025-05-22 NOTE — PROGRESS NOTES
Date: May 22, 2025  Time In: 08:01 EDT  Time out: 8:55 AM EST    This provider is located at [facility], [facility] [place], [place], [facility], using a secure [employer] Video Visit through [employer]. Patient is being seen remotely via telehealth at their home address is located in [place]. Patient stated they are in a secure environment for this session. The patient's condition being diagnosed and treated is appropriate for telemedicine. The provider identified themself as well as their credentials. The patient, or  patient's legal guardian consent to be seen remotely, and when consent is given they understand that the consent allows for patient identifiable information to be sent to a third party as needed. They may refuse to be seen remotely at any time. The electronic data is encrypted and password protected, and the patient's or  legal guardian has been advised of the potential risks to privacy not withstanding such measures.   PT Identifiers used: Name and .    You have chosen to receive care through a telehealth visit.  Do you consent to use a video/audio connection for your medical care today? Yes    Subjective   Lake Sprague is a 34 y.o. female who presents today for follow up    Chief Complaint:   Chief Complaint   Patient presents with    Depression    PTSD    Anxiety        Data: Pt reported that she missed her last appointment, tried to cancel but was unable to. Pt reported she has been in the hospital 2x this week due to stomach pain. Pt reported that she had a panic attack in the hospital. Pt reported that she has also started to experience issues with her hand, has had surgery 2x already. Pt reported anxiety has been high due to physical health issues. Pt reported that she has been struggling with depression, unhappy with this current phase of life. Pt reported that she has passive thought but no intent on harming herself. Pt reported that she feels like she is always having  to do something and getting burnt out. Pt reported that she has very little time for herself.      Clinical Maneuvering/Intervention:  Assisted patient in processing above session content; acknowledged and normalized patient’s thoughts, feelings, and concerns.  Rationalized patient thought process regarding concerns presented at session.  Discussed triggers associated with patient's  anxiety , depression , and PTSD Also discussed coping skills for patient to implement such as grounding , mindfulness , increasing activity , self care , and positive self talk desensitization     Allowed patient to freely discuss issues without interruption or judgment. Provided safe, confidential environment to facilitate the development of positive therapeutic relationship and encourage open, honest communication. Assisted patient in identifying risk factors which would indicate the need for higher level of care including thoughts to harm self or others and/or self-harming behavior and encouraged patient to contact this office, call 911, or present to the nearest emergency room should any of these events occur. Discussed crisis intervention services and means to access. Patient adamantly and convincingly denies current suicidal or homicidal ideation or perceptual disturbance.    Assessment: Pt was alert and oriented x3.  Pt appears open and honest re MH symptoms that have affected life in a negative way. Pt appears motivated to improve MH symptoms and overall quality of life. Pt appears to be experiencing an increase with anxiety and depression with current situation. Pt appears to struggle with compartmentalizing and becoming overwhelmed. Pt expressed passive suicidal ideations with no plan/intent currently. Pt appeared receptive to contacting  911, [phone], a support or the office if thoughts increased -developing a plan. Pt reported feeling safe and denies being a threat to self at this time. Pt appears to struggle with down  time/relaxing due to negative internal monologue. Pt appeared receptive to techniques and encouragements discussed in session.     Patient appears to maintain relative stability as compared to their baseline.  However, patient continues to struggle with   Chief Complaint   Patient presents with    Depression    PTSD    Anxiety    which continues to cause impairment in important areas of functioning.  A result, they can be reasonably expected to continue to benefit from treatment and would likely be at increased risk for decompensation otherwise.      Mental Status Exam:   Hygiene:   fair  Cooperation:  Cooperative  Eye Contact:  Fair  Psychomotor Behavior:  Appropriate  Mood: anxious, stressed  Speech:  Normal  Thought Process:  Linear  Thought Content:  Mood congruent  Suicidal:  passive  Homicidal:  None  Hallucinations:  None  Delusion:  None  Memory:  Intact  Orientation:  Grossly intact  Reliability:  fair  Insight:  Fair  Judgement:  Fair  Impulse Control:  Fair  Physical/Medical Issues:  Yes          Patient's Support Network Includes:   limited healthy support     Functional Status: No impairment    Progress toward goal: Not at goal    Prognosis: Fair with Ongoing Treatment     Plan: wait list    Patient will continue in individual outpatient therapy with focus on improved functioning and coping skills, maintaining stability, and avoiding decompensation and the need for higher level of care.    Patient will adhere to medication regimen as prescribed and report any side effects. Patient will contact this office, call 911 or present to the nearest emergency room should suicidal or homicidal ideations occur. Provide Cognitive Behavioral Therapy and Solution Focused Therapy to improve functioning, maintain stability, and avoid decompensation and the need for higher level of care.     Return in about 8 weeks (around 7/17/2025).      VISIT DIAGNOSIS:    Diagnosis Plan   1. Bipolar II disorder        2. Generalized  anxiety disorder        3. Post traumatic stress disorder (PTSD)        4. Major depressive disorder, recurrent episode, moderate         08:01 EDT         This document has been electronically signed by Lay Le LCSW  May 22, 2025      Part of this note may be an electronic transcription/translation of spoken language to printed text using the Dragon Dictation System.

## 2025-05-22 NOTE — TREATMENT PLAN
Multi-Disciplinary Problems (from Behavioral Health Treatment Plan)      Active Problems       Problem: Anxiety  Start Date: 05/22/25      Problem Details: The patient self-scales this problem as a 9 with 10 being the worst.          Goal Priority Start Date Expected End Date End Date    Patient will develop and implement behavioral and cognitive strategies to reduce anxiety and irrational fears. -- 05/22/25 11/20/25 --    Goal Details: Progress toward goal:  The patient self-scales their progress related to this goal as a 9 with 10 being the worst.        Goal Intervention Frequency Start Date End Date    Help patient explore past emotional issues in relation to present anxiety. Q Month 05/22/25 --    Intervention Details: Duration of treatment until discharged.        Goal Intervention Frequency Start Date End Date    Help patient develop an awareness of their cognitive and physical responses to anxiety. Q Month 05/22/25 --    Intervention Details: Duration of treatment until discharged.                Problem: Depression  Start Date: 05/22/25      Problem Details: The patient self-scales this problem as a 10 with 10 being the worst.          Goal Priority Start Date Expected End Date End Date    Patient will demonstrate the ability to initiate new constructive life skills outside of sessions on a consistent basis. -- 05/22/25 11/20/25 --    Goal Details: Progress toward goal:  The patient self-scales their progress related to this goal as a 10 with 10 being the worst.        Goal Intervention Frequency Start Date End Date    Assist patient in setting attainable activities of daily living goals. Q Month 05/22/25 --    Intervention Details: Clinician will aid pt in increasing structure, routine, focusing accomplishments instead of perceived failures.     Clinician will aid pt with implementing coping skills or techniques that aid with increasing natural dopamine, serotonin and endorphins.          Goal Intervention  Frequency Start Date End Date    Provide education about depression Q Month 05/22/25 --    Intervention Details: Duration of treatment until discharged.        Goal Intervention Frequency Start Date End Date    Assist patient in developing healthy coping strategies. Q Month 05/22/25 --    Intervention Details: Duration of treatment until discharged.                Problem: Mood Instability  Start Date: 05/22/25      Problem Details: The patient self-scales this problem as a 7 with 10 being the worst.          Goal Priority Start Date Expected End Date End Date    Patient will achieve mood stability as evidenced by controlled behavior and a more deliberate thought process -- 05/22/25 11/20/25 --    Goal Details: Progress toward goal:  The patient self-scales their progress related to this goal as a 7 with 10 being the worst.        Goal Intervention Frequency Start Date End Date    Provide structure and focus to patient's thoughts and actions by establishing plans and routine. Q Month 05/22/25 --    Intervention Details: Duration of treatment until discharged.        Goal Intervention Frequency Start Date End Date    Assist patient in setting responsible goals and limits in behavior. Q Month 05/22/25 --    Intervention Details: Duration of treatment until discharged.                        Reviewed By       Lay Le LCSW 05/22/25 0801                     I have discussed and reviewed this treatment plan with the patient.

## 2025-05-23 ENCOUNTER — OFFICE VISIT (OUTPATIENT)
Dept: FAMILY MEDICINE CLINIC | Facility: CLINIC | Age: 35
End: 2025-05-23
Payer: MEDICAID

## 2025-05-23 VITALS
OXYGEN SATURATION: 97 % | BODY MASS INDEX: 44.05 KG/M2 | DIASTOLIC BLOOD PRESSURE: 70 MMHG | HEIGHT: 63 IN | HEART RATE: 82 BPM | WEIGHT: 248.6 LBS | SYSTOLIC BLOOD PRESSURE: 110 MMHG | TEMPERATURE: 98.5 F

## 2025-05-23 DIAGNOSIS — R10.84 GENERALIZED ABDOMINAL PAIN: ICD-10-CM

## 2025-05-23 DIAGNOSIS — J45.21 MILD INTERMITTENT ASTHMA WITH ACUTE EXACERBATION: ICD-10-CM

## 2025-05-23 DIAGNOSIS — M67.432 GANGLION CYST OF VOLAR ASPECT OF LEFT WRIST: Primary | ICD-10-CM

## 2025-05-23 RX ORDER — MONTELUKAST SODIUM 10 MG/1
10 TABLET ORAL NIGHTLY
Qty: 30 TABLET | Refills: 5 | Status: SHIPPED | OUTPATIENT
Start: 2025-05-23

## 2025-05-23 RX ORDER — PREDNISONE 20 MG/1
40 TABLET ORAL DAILY
Qty: 10 TABLET | Refills: 0 | Status: SHIPPED | OUTPATIENT
Start: 2025-05-23 | End: 2025-05-28

## 2025-05-23 RX ORDER — FLUTICASONE PROPIONATE 110 UG/1
1 AEROSOL, METERED RESPIRATORY (INHALATION)
Qty: 12 G | Refills: 11 | Status: SHIPPED | OUTPATIENT
Start: 2025-05-23

## 2025-05-23 RX ORDER — MELOXICAM 15 MG/1
15 TABLET ORAL DAILY
Qty: 30 TABLET | Refills: 2 | Status: SHIPPED | OUTPATIENT
Start: 2025-05-23

## 2025-05-23 NOTE — PROGRESS NOTES
Chief Complaint  Abdominal Pain (Follow up recent ER visit doing much better) and Hand Pain (Left hand pain states she has a cyst but giving her problems with work and home activities in general)    Subjective        Lake Sprague presents to [facility]  Abdominal Pain  Hand Pain            The patient presents for evaluation of a cyst on her hand, wheezing, and abdominal pain.    She reports an 80% improvement in her abdominal symptoms. She experienced abdominal pain on 05/18/2025 around 4:30 PM, prompting a visit to [facility] emergency room. Despite normal scans, she was prescribed antidiarrheal medication and discharged. The following night, the pain recurred, leading to another ER visit at [facility], [place], operated by [facility]. She was informed that her marijuana use and associated coughing could be exacerbating her stomach pain. She has since ceased marijuana use and has not experienced any further issues. She was prescribed Benadryl, which provided some relief, unlike the morphine administered at [facility]. She continues to take over-the-counter Benadryl.    Her primary concern is a cyst on her hand, which was initially painless but has since become painful and is rapidly spreading. The pain extends to her pinky finger, causing difficulty in gripping objects. This has significantly impacted her daily activities, including caring for her 8.5-month-old child. She has been using a brace for support, which provides some relief by limiting movement. She also reports tension in the affected area. She has been managing the pain with Tylenol, which she reports as ineffective. She had previously been prescribed ibuprofen 800 mg, but a recent refill request was denied by her OB's office.    She also reports wheezing, which she attributes to allergies that have worsened since childbirth. She has a rescue inhaler but reports frequent use. She has never used a maintenance inhaler. She has a history of bronchitis and has  "previously been treated with prednisone.         Objective   Vital Signs:  /70 (BP Location: Right arm, Patient Position: Sitting, Cuff Size: Large Adult)   Pulse 82   Temp 98.5 °F (36.9 °C)   Ht 160 cm (63\")   Wt 113 kg (248 lb 9.6 oz)   SpO2 97%   BMI 44.04 kg/m²   Estimated body mass index is 44.04 kg/m² as calculated from the following:    Height as of this encounter: 160 cm (63\").    Weight as of this encounter: 113 kg (248 lb 9.6 oz).        Physical Exam  Constitutional:       General: She is not in acute distress.  Eyes:      Conjunctiva/sclera: Conjunctivae normal.   Cardiovascular:      Rate and Rhythm: Normal rate and regular rhythm.   Pulmonary:      Effort: No respiratory distress.      Breath sounds: Wheezing present.   Musculoskeletal:      Comments: Tenderness to palpation over volar aspect of wrist   Neurological:      Mental Status: She is alert and oriented to person, place, and time.   Psychiatric:         Mood and Affect: Mood normal.         Behavior: Behavior normal.        Result Review :  The following data was reviewed by: Nydia Vasquez MD on 05/23/2025:  Common labs          8/29/2024    05:55 8/30/2024    09:32 5/20/2025    09:19   Common Labs   Glucose   110    BUN   9    Creatinine   0.63    Sodium   138    Potassium   3.7    Chloride   106    Calcium   9.3    Albumin   4.1    Total Bilirubin   0.4    Alkaline Phosphatase   73    AST (SGOT)   16    ALT (SGPT)   15    WBC 11.59  11.70  9.41    Hemoglobin 11.1  11.0  13.4    Hematocrit 33.9  34.5  40.5    Platelets 324  313  336      Data reviewed : none           Assessment and Plan   Diagnoses and all orders for this visit:    1. Ganglion cyst of volar aspect of left wrist (Primary)  -     meloxicam (MOBIC) 15 MG tablet; Take 1 tablet by mouth Daily.  Dispense: 30 tablet; Refill: 2    2. Mild intermittent asthma with acute exacerbation  -     predniSONE (DELTASONE) 20 MG tablet; Take 2 tablets by mouth Daily for 5 days.  " Dispense: 10 tablet; Refill: 0  -     fluticasone (Flovent HFA) 110 MCG/ACT inhaler; Inhale 1 puff 2 (Two) Times a Day.  Dispense: 12 g; Refill: 11  -     montelukast (Singulair) 10 MG tablet; Take 1 tablet by mouth Every Night.  Dispense: 30 tablet; Refill: 5    3. Generalized abdominal pain           1. Cyst on hand.  - The cyst is causing significant discomfort and affecting daily activities, including work and childcare.  - A thumb immobilization brace is recommended to prevent movement and alleviate pain.  - A prescription for meloxicam 15 mg once daily has been provided. She is advised to continue using Tylenol but to avoid concurrent use of ibuprofen or Aleve. Ice application is also suggested for additional relief.  - She will follow up with the hand surgeon for further evaluation and management.    2. Asthma exacerbation  - The wheezing is likely exacerbated by allergies.  - A prescription for Singulair 10 mg to be taken nightly has been provided. Potential side effects, including unusual dreams and suicidal ideation, have been discussed.  - A maintenance inhaler has also been prescribed. If the cost of the inhaler is prohibitive, she is advised not to purchase it and to inform us accordingly.  - A 5-day course of prednisone 40 mg has been prescribed.    3. Abdominal pain.  - overall improved  - The abdominal pain may be indicative of gastroenteritis, as suggested by the presence of inflammation in the colon on the CT scan.  - She is advised to monitor her symptoms closely.  - If the abdominal pain persists, or if she experiences diarrhea, blood in stool, mucousy stools, or multiple episodes of diarrhea per day that disrupt her sleep, further investigation into potential inflammatory bowel disease will be warranted. This could include stool tests and lab work, and possibly a colonoscopy if initial tests are abnormal.  - She is advised to return for further evaluation if symptoms persist within the next 2  months.            Follow Up   No follow-ups on file.  Patient was given instructions and counseling regarding her condition or for health maintenance advice. Please see specific information pulled into the AVS if appropriate.     Patient or patient representative verbalized consent for the use of Ambient Listening during the visit with  Nydia Vasquez MD for chart documentation. 5/23/2025  13:52 EDT

## 2025-06-12 ENCOUNTER — TELEMEDICINE (OUTPATIENT)
Dept: PSYCHIATRY | Facility: CLINIC | Age: 35
End: 2025-06-12
Payer: MEDICAID

## 2025-06-12 DIAGNOSIS — Z53.21 PATIENT LEFT WITHOUT BEING SEEN: Primary | ICD-10-CM

## 2025-06-12 NOTE — PROGRESS NOTES
The patient left the office after care was provided and did not complete the visit pt had other obligations same time as appt.

## 2025-06-24 DIAGNOSIS — J45.21 MILD INTERMITTENT ASTHMA WITH ACUTE EXACERBATION: ICD-10-CM

## 2025-06-24 DIAGNOSIS — M67.432 GANGLION CYST OF VOLAR ASPECT OF LEFT WRIST: ICD-10-CM

## 2025-06-24 RX ORDER — MONTELUKAST SODIUM 10 MG/1
10 TABLET ORAL NIGHTLY
Qty: 30 TABLET | Refills: 5 | Status: SHIPPED | OUTPATIENT
Start: 2025-06-24

## 2025-06-24 RX ORDER — MELOXICAM 15 MG/1
15 TABLET ORAL DAILY
Qty: 30 TABLET | Refills: 2 | Status: SHIPPED | OUTPATIENT
Start: 2025-06-24

## 2025-06-24 RX ORDER — ALBUTEROL SULFATE 90 UG/1
2 INHALANT RESPIRATORY (INHALATION) EVERY 4 HOURS PRN
Qty: 18 G | Refills: 2 | Status: SHIPPED | OUTPATIENT
Start: 2025-06-24

## 2025-07-28 DIAGNOSIS — J45.21 MILD INTERMITTENT ASTHMA WITH ACUTE EXACERBATION: ICD-10-CM

## 2025-07-29 RX ORDER — FLUTICASONE PROPIONATE 110 UG/1
1 AEROSOL, METERED RESPIRATORY (INHALATION)
Qty: 12 G | Refills: 11 | Status: SHIPPED | OUTPATIENT
Start: 2025-07-29

## 2025-07-29 RX ORDER — ALBUTEROL SULFATE 90 UG/1
2 INHALANT RESPIRATORY (INHALATION) EVERY 4 HOURS PRN
Qty: 18 G | Refills: 2 | Status: SHIPPED | OUTPATIENT
Start: 2025-07-29

## 2025-07-31 ENCOUNTER — TELEMEDICINE (OUTPATIENT)
Dept: PSYCHIATRY | Facility: CLINIC | Age: 35
End: 2025-07-31
Payer: MEDICAID

## 2025-07-31 DIAGNOSIS — F33.1 MAJOR DEPRESSIVE DISORDER, RECURRENT EPISODE, MODERATE: ICD-10-CM

## 2025-07-31 DIAGNOSIS — F31.81 BIPOLAR II DISORDER: Primary | ICD-10-CM

## 2025-07-31 DIAGNOSIS — F43.10 POST TRAUMATIC STRESS DISORDER (PTSD): ICD-10-CM

## 2025-07-31 DIAGNOSIS — F41.1 GENERALIZED ANXIETY DISORDER: ICD-10-CM

## 2025-07-31 NOTE — PROGRESS NOTES
Date: 2025  Time In: 09:01 EDT  Time out: 9:55 AM EST    This provider is located at [facility], [facility] [place], [place], [facility], using a secure [email or web address] Video Visit through [employer]. Patient is being seen remotely via telehealth at their home address is located in [place]. Patient stated they are in a secure environment for this session. The patient's condition being diagnosed and treated is appropriate for telemedicine. The provider identified themself as well as their credentials. The patient, or  patient's legal guardian consent to be seen remotely, and when consent is given they understand that the consent allows for patient identifiable information to be sent to a third party as needed. They may refuse to be seen remotely at any time. The electronic data is encrypted and password protected, and the patient's or  legal guardian has been advised of the potential risks to privacy not withstanding such measures.   PT Identifiers used: Name and .    You have chosen to receive care through a telehealth visit.  Do you consent to use a video/audio connection for your medical care today? Yes    Subjective   Lake Sprague is a 34 y.o. female who presents today for follow up    Chief Complaint:   Chief Complaint   Patient presents with    Anxiety    Depression    PTSD        Data: Pt reported that she is feeling very overwhelmed. Pt reported that she stopped taking her Zoloft after experiencing SI. Pt reported continuing to experience SI with no plan or intent. Pt reflected on difficulties of being a new mom again, working and partner. Pt reported struggling to be needed so much from her one year old. Pt reported that she never gets time to self. Pt reflected on emotions, not feeling connected with baby, irritated, mood swings, intense sadness. Pt reflected on upcoming plans for August/September that will cause stress and anxiety.    Clinical Maneuvering/Intervention:  Assisted  patient in processing above session content; acknowledged and normalized patient’s thoughts, feelings, and concerns.  Rationalized patient thought process regarding concerns presented at session.  Discussed triggers associated with patient's  anxiety , depression , and PTSD Also discussed coping skills for patient to implement such as grounding , 4:6 breathing , mindfulness , increasing activity , self care , and positive self talk     Allowed patient to freely discuss issues without interruption or judgment. Provided safe, confidential environment to facilitate the development of positive therapeutic relationship and encourage open, honest communication. Assisted patient in identifying risk factors which would indicate the need for higher level of care including thoughts to harm self or others and/or self-harming behavior and encouraged patient to contact this office, call 911, or present to the nearest emergency room should any of these events occur. Discussed crisis intervention services and means to access. Patient adamantly and convincingly denies current suicidal or homicidal ideation or perceptual disturbance.    Assessment:  Pt was alert and oriented x3. Pt appears open and honest re MH symptoms that have affected life in a negative way. Pt appears motivated to improve MH symptoms and overall quality of life. Pt appears overwhelmed at this time. Pt appears to be struggling to compartmentalize. Pt appears to have limited support. Pt appears to feel disconnected from baby and struggling with irritation easily.  Pt expressed passive suicidal ideations with no plan/intent currently. Pt appeared receptive to contacting  911, [phone], a support or the office if thoughts increased -developing a plan. Pt reported feeling safe and denies being a threat to self at this time. Pt has a hx of self harm, currently denies action or thoughts. Unalterable demographics and a history of mental health intervention indicate this  patient is in a high-risk category compared to the general population. Pt appeared receptive to techniques and encouragements discussed in session.     Patient appears to maintain relative stability as compared to their baseline.  However, patient continues to struggle with   Chief Complaint   Patient presents with    Anxiety    Depression    PTSD    which continues to cause impairment in important areas of functioning.  A result, they can be reasonably expected to continue to benefit from treatment and would likely be at increased risk for decompensation otherwise.      Mental Status Exam:   Hygiene:   fair  Cooperation:  Cooperative  Eye Contact:  Fair  Psychomotor Behavior:  Appropriate  Mood: depressed, anxious, panicky, and irritable  Speech:  Rapid  Thought Process:  Disorganized  Thought Content:  Mood congruent  Suicidal:  passive  Homicidal:  None  Hallucinations:  None  Delusion:  None  Memory:  Deficits  Orientation:  Grossly intact  Reliability:  fair  Insight:  Fair  Judgement:  Fair  Impulse Control:  Fair  Physical/Medical Issues:  No        Patient's Support Network Includes:  limited    Functional Status: Mild impairment     Progress toward goal: Not at goal    Prognosis: Fair with Ongoing Treatment     Plan: referral to psych    Patient will continue in individual outpatient therapy with focus on improved functioning and coping skills, maintaining stability, and avoiding decompensation and the need for higher level of care.    Patient will adhere to medication regimen as prescribed and report any side effects. Patient will contact this office, call 911 or present to the nearest emergency room should suicidal or homicidal ideations occur. Provide Cognitive Behavioral Therapy and Solution Focused Therapy to improve functioning, maintain stability, and avoid decompensation and the need for higher level of care.     Return in about 2 weeks (around 8/14/2025).      VISIT DIAGNOSIS:    Diagnosis Plan    1. Bipolar II disorder  Ambulatory Referral to Psychology      2. Generalized anxiety disorder  Ambulatory Referral to Psychology      3. Major depressive disorder, recurrent episode, moderate  Ambulatory Referral to Psychology      4. Post traumatic stress disorder (PTSD)  Ambulatory Referral to Psychology      5. Post partum depression         09:01 EDT         This document has been electronically signed by Lay Le LCSW  July 31, 2025      Part of this note may be an electronic transcription/translation of spoken language to printed text using the Dragon Dictation System.

## 2025-08-18 RX ORDER — ALBUTEROL SULFATE 90 UG/1
2 INHALANT RESPIRATORY (INHALATION) EVERY 4 HOURS PRN
Qty: 18 G | Refills: 2 | Status: SHIPPED | OUTPATIENT
Start: 2025-08-18

## (undated) DEVICE — GLV SURG BIOGEL LTX PF 6

## (undated) DEVICE — SUT VIC PLS 0 CTX 36IN UD VCP978H

## (undated) DEVICE — Device

## (undated) DEVICE — KIWI® VACUUM DELIVERY SYSTEM PROCUP®: Brand: KIWI® PROCUP®

## (undated) DEVICE — SOL IRR H2O BTL 1000ML STRL

## (undated) DEVICE — ANTIBACTERIAL UNDYED BRAIDED (POLYGLACTIN 910), SYNTHETIC ABSORBABLE SUTURE: Brand: COATED VICRYL

## (undated) DEVICE — SUT MNCRYL PLS ANTIB UD 4/0 PS2 18IN

## (undated) DEVICE — ADHS SKIN PREMIERPRO EXOFIN TOPICAL HI/VISC .5ML